# Patient Record
Sex: MALE | Race: WHITE | NOT HISPANIC OR LATINO | Employment: FULL TIME | URBAN - METROPOLITAN AREA
[De-identification: names, ages, dates, MRNs, and addresses within clinical notes are randomized per-mention and may not be internally consistent; named-entity substitution may affect disease eponyms.]

---

## 2017-11-08 ENCOUNTER — APPOINTMENT (EMERGENCY)
Dept: RADIOLOGY | Facility: HOSPITAL | Age: 42
End: 2017-11-08
Payer: COMMERCIAL

## 2017-11-08 ENCOUNTER — HOSPITAL ENCOUNTER (EMERGENCY)
Facility: HOSPITAL | Age: 42
Discharge: HOME | End: 2017-11-08
Attending: EMERGENCY MEDICINE | Admitting: EMERGENCY MEDICINE
Payer: COMMERCIAL

## 2017-11-08 VITALS
HEIGHT: 68 IN | OXYGEN SATURATION: 99 % | TEMPERATURE: 98 F | BODY MASS INDEX: 28.79 KG/M2 | RESPIRATION RATE: 18 BRPM | HEART RATE: 92 BPM | SYSTOLIC BLOOD PRESSURE: 118 MMHG | DIASTOLIC BLOOD PRESSURE: 60 MMHG | WEIGHT: 190 LBS

## 2017-11-08 DIAGNOSIS — S29.019A: Primary | ICD-10-CM

## 2017-11-08 PROCEDURE — 71100 X-RAY EXAM RIBS UNI 2 VIEWS: CPT

## 2017-11-08 PROCEDURE — 71020 HB CHEST X-RAY 2VW FRONTAL&LATL: CPT

## 2017-11-08 PROCEDURE — 99284 EMERGENCY DEPT VISIT MOD MDM: CPT

## 2017-11-08 RX ORDER — OXYCODONE HYDROCHLORIDE AND ACETAMINOPHEN 5; 325 MG/1; MG/1
1 TABLET ORAL EVERY 6 HOURS PRN
Qty: 20 TABLET | Refills: 0 | Status: SHIPPED | OUTPATIENT
Start: 2017-11-08 | End: 2019-05-12 | Stop reason: ALTCHOICE

## 2017-11-09 NOTE — DISCHARGE INSTRUCTIONS
Rest  Ice to the area  Motrin Tylenol for mild pain  Percocet 1 every 6 hours if needed for increasing pain or tenderness  Follow up with your doctor or return if worse    Muscle Strain   WHAT YOU NEED TO KNOW:   A muscle strain is a twist, pull, or tear of a muscle or tendon  A tendon is a strong elastic tissue that connects a muscle to a bone  Signs of a strained muscle include bruising and swelling over the area, pain with movement, and loss of strength  DISCHARGE INSTRUCTIONS:   Return to the emergency department if:   · You suddenly cannot feel or move your injured muscle  Contact your healthcare provider if:   · Your pain and swelling worsen or do not go away  · You have questions or concerns about your condition or care  Medicines:   · NSAIDs  help decrease swelling and pain or fever  This medicine is available with or without a doctor's order  NSAIDs can cause stomach bleeding or kidney problems in certain people  If you take blood thinner medicine, always ask your healthcare provider if NSAIDs are safe for you  Always read the medicine label and follow directions  · Muscle relaxers  help decrease pain and muscle spasms  · Take your medicine as directed  Contact your healthcare provider if you think your medicine is not helping or if you have side effects  Tell him of her if you are allergic to any medicine  Keep a list of the medicines, vitamins, and herbs you take  Include the amounts, and when and why you take them  Bring the list or the pill bottles to follow-up visits  Carry your medicine list with you in case of an emergency  Follow up with your healthcare provider as directed: Your healthcare provider may suggest that you have a follow-up visit before you go back to your usual activity  Write down your questions so you remember to ask them during your visits    Self-care:   · 3 to 7 days after the injury:  Use Rest, Ice, Compression, and Elevation (RICE) to help stop bruising and decrease pain and swelling  ¨ Rest:  Rest your muscle to allow your injury to heal  When the pain decreases, begin normal, slow movements  For mild and moderate muscle strains, you should rest your muscles for about 2 days  However, if you have a severe muscle strain, you should rest for 10 to 14 days  You may need to use crutches to walk if your muscle strain is in your legs or lower body  ¨ Ice:  Put an ice pack on the injured area  Put a towel between the ice pack and your skin  Do not put the ice pack directly on your skin  You can use a package of frozen peas instead of an ice pack  ¨ Compression:  You may need to wrap an elastic bandage around the area to decrease swelling  It should be tight enough for you to feel support  Do not wrap it too tightly  ¨ Elevation:  Keep the injured muscle raised above your heart if possible  For example if you have a strain of your lower leg muscle, lie down and prop your leg up on pillows  This helps decrease pain and swelling  · 3 to 21 days after the injury:  Start to slowly and regularly exercise your muscle  This will help it heal  If you feel pain, decrease how hard you are exercising  · 1 to 6 weeks after the injury:  Stretch the injured muscle  Hold the stretch for about 30 seconds  Do this 4 times a day  You may stretch the muscle until you feel a slight pull  Stop stretching if you feel pain  · 2 weeks to 6 months after the injury:  The goal of this phase is to return to the activity you were doing before the injury happened, without hurting the muscle again  · 3 weeks to 6 months after the injury:  Keep stretching and strengthening your muscles to avoid injury  Slowly increase the time and distance that you exercise  You may have signs and symptoms of muscle strain 6 months after the injury, even if you do things to help it heal  In this case, you may need surgery on the muscle    © 2017 [employer] [employer] Information is for End User's use only and may not be sold, redistributed or otherwise used for commercial purposes  All illustrations and images included in CareNotes® are the copyrighted property of A D A M [employer] Inc  or Rajat Sierra  The above information is an  only  It is not intended as medical advice for individual conditions or treatments  Talk to your doctor, nurse or pharmacist before following any medical regimen to see if it is safe and effective for you

## 2017-11-09 NOTE — ED PROVIDER NOTES
History  Chief Complaint   Patient presents with    Flank Pain     PT COUGHED AROUND 6PM AND FELT A POP IN HIS RIGHT SIDE - HAVING PAIN DENIES NVD     HPI patient is a 47-year-old male, he reports he was coming home from work, coughed and felt a sudden pop in his right lower ribs  Patient complains of a sharp right lower rib pain, points to approximately the 12th rib on the right side  He complains of pain now with deep inspiration  He complains of pain with cough  He reports pain with palpation  Patient has no pain at rest   But when he begins to move or role he gets severe pain in that area  He denies any vomiting  There are no urinary symptoms  Denies any fever or chills  Denies any previous rib injury  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 initially he thought he hurt himself on his gun but the pain became worse with movement  Patient reports the pain was produced by a sudden cough  Past medical history of back problems  Family history noncontributory  Social history, works in law enforcement,, nonsmoker, here with his wife, apparently going on a vacation tomorrow  None       History reviewed  No pertinent past medical history  Past Surgical History:   Procedure Laterality Date    BACK SURGERY         History reviewed  No pertinent family history  I have reviewed and agree with the history as documented  Social History   Substance Use Topics    Smoking status: Never Smoker    Smokeless tobacco: Never Used    Alcohol use No        Review of Systems   Constitutional: Negative for diaphoresis, fatigue and fever  HENT: Negative for congestion, ear pain, nosebleeds and sore throat  Eyes: Negative for photophobia, pain, discharge and visual disturbance  Respiratory: Negative for cough, choking, chest tightness, shortness of breath and wheezing  Cardiovascular: Negative for chest pain and palpitations     Gastrointestinal: Negative for abdominal distention, abdominal pain, diarrhea and vomiting  Genitourinary: Negative for dysuria, flank pain and frequency  Musculoskeletal: Negative for back pain, gait problem and joint swelling  Skin: Negative for color change and rash  Neurological: Negative for dizziness, syncope and headaches  Psychiatric/Behavioral: Negative for behavioral problems and confusion  The patient is not nervous/anxious  All other systems reviewed and are negative   right rib pain    Physical Exam  ED Triage Vitals [11/08/17 2034]   Temperature Pulse Respirations Blood Pressure SpO2   98 °F (36 7 °C) 92 18 118/60 99 %      Temp Source Heart Rate Source Patient Position - Orthostatic VS BP Location FiO2 (%)   Temporal Monitor Sitting Right arm --      Pain Score       8           Orthostatic Vital Signs  Vitals:    11/08/17 2034   BP: 118/60   Pulse: 92   Patient Position - Orthostatic VS: Sitting       Physical Exam   Constitutional: He is oriented to person, place, and time  He appears well-developed and well-nourished  HENT:   Head: Normocephalic  Right Ear: External ear normal    Left Ear: External ear normal    Nose: Nose normal    Mouth/Throat: Oropharynx is clear and moist    Eyes: EOM and lids are normal  Pupils are equal, round, and reactive to light  Neck: Normal range of motion  Neck supple  Cardiovascular: Normal rate, regular rhythm and normal heart sounds  Pulmonary/Chest: Effort normal  No respiratory distress  He exhibits tenderness  There is tenderness on the right side, right lower ribs approximately the 12th rib, no pain when at rest, there is pain with movement twisting or rotation nurse pain with direct palpation  No no subcutaneous air   Abdominal: Soft  Bowel sounds are normal  He exhibits no distension and no mass  There is no tenderness  There is no rebound and no guarding  No hernia  Musculoskeletal: Normal range of motion  He exhibits no deformity     Neurological: He is alert and oriented to person, place, and time  Skin: Skin is warm and dry  Psychiatric: He has a normal mood and affect  Nursing note and vitals reviewed  Pulse oximetry 99% on room air adequate oxygenation, no  hypoxia    ED Medications  Medications - No data to display    Diagnostic Studies  Results Reviewed     None                 XR chest pa & lateral    (Results Pending)   XR ribs 2 views RIGHT    (Results Pending)              Procedures  Procedures       Phone Contacts  ED Phone Contact    ED Course  ED Course         Chest x-ray: Chest x-ray showed a normal cardiac silhouette, no pneumothorax no infiltrates, No sign of pathology, interpreted by me, I was the primary   right rib x-ray shows no fracture interpreted by me I was initial   MDM medical decision making 57-year-old male with right-sided rib pain, tenderness over the right lower 12th rib, pain with movement, no pain with rest, no shortness of breath, no indication for further evaluated nonstick evaluation, negative x-rays chest x-ray and right rib x-rays  Most consistent with intercostal muscle tear  Discussed with patient at length  Patient is traveling on vacation over the next few days, will treat with narcotic analgesics if needed  We discussed treatment and follow-up  CritCare Time    Disposition  Final diagnoses:   Tear of intercostal muscle, initial encounter     Time reflects when diagnosis was documented in both MDM as applicable and the Disposition within this note     Time User Action Codes Description Comment    11/8/2017  9:32 PM Rere Santo Add [[id number]] Tear of intercostal muscle, initial encounter       ED Disposition     ED Disposition Condition Comment    Discharge  Nahed Villalobos discharge to home/self care      Condition at discharge: Good        Follow-up Information     Follow up With Specialties Details Why Contact Solo Cope,     [address] [place] [id number]          Discharge Medication List as of 11/8/2017  9:33 PM      START taking these medications    Details   oxyCODONE-acetaminophen (PERCOCET) 5-325 mg per tablet Take 1 tablet by mouth every 6 (six) hours as needed for severe pain for up to 20 doses Max Daily Amount: 4 tablets, Starting Wed 11/8/2017, Print           No discharge procedures on file      ED Provider  Electronically Signed by           Jessica Hernandez MD  11/08/17 1363

## 2017-12-13 ENCOUNTER — HOSPITAL ENCOUNTER (EMERGENCY)
Facility: HOSPITAL | Age: 42
Discharge: HOME | End: 2017-12-13
Admitting: EMERGENCY MEDICINE
Payer: COMMERCIAL

## 2017-12-13 VITALS
HEART RATE: 85 BPM | WEIGHT: 199.52 LBS | SYSTOLIC BLOOD PRESSURE: 141 MMHG | BODY MASS INDEX: 30.34 KG/M2 | RESPIRATION RATE: 18 BRPM | TEMPERATURE: 98.7 F | OXYGEN SATURATION: 96 % | DIASTOLIC BLOOD PRESSURE: 77 MMHG

## 2017-12-13 DIAGNOSIS — J20.9 ACUTE BRONCHITIS WITH BRONCHOSPASM: Primary | ICD-10-CM

## 2017-12-13 PROCEDURE — 99283 EMERGENCY DEPT VISIT LOW MDM: CPT

## 2017-12-13 RX ORDER — PREDNISONE 1 MG/1
TABLET ORAL
Qty: 21 TABLET | Refills: 0 | Status: SHIPPED | OUTPATIENT
Start: 2017-12-13 | End: 2019-05-12 | Stop reason: ALTCHOICE

## 2017-12-13 RX ORDER — ALBUTEROL SULFATE 90 UG/1
2 AEROSOL, METERED RESPIRATORY (INHALATION) EVERY 6 HOURS PRN
Qty: 1 INHALER | Refills: 0 | Status: SHIPPED | OUTPATIENT
Start: 2017-12-13

## 2017-12-13 RX ORDER — AZITHROMYCIN 250 MG/1
TABLET, FILM COATED ORAL
Qty: 6 TABLET | Refills: 0 | Status: SHIPPED | OUTPATIENT
Start: 2017-12-13 | End: 2019-05-12 | Stop reason: ALTCHOICE

## 2017-12-13 NOTE — DISCHARGE INSTRUCTIONS
Acute Bronchitis   WHAT YOU NEED TO KNOW:   Acute bronchitis is swelling and irritation in the air passages of your lungs  This irritation may cause you to cough or have other breathing problems  Acute bronchitis often starts because of another illness, such as a cold or the flu  The illness spreads from your nose and throat to your windpipe and airways  Bronchitis is often called a chest cold  Acute bronchitis lasts about 3 to 6 weeks and is usually not a serious illness  Your cough can last for several weeks  DISCHARGE INSTRUCTIONS:   Return to the emergency department if:   · You cough up blood  · Your lips or fingernails turn blue  · You feel like you are not getting enough air when you breathe  Contact your healthcare provider if:   · You have a fever  · Your breathing problems do not go away or get worse  · Your cough does not get better within 4 weeks  · You have questions or concerns about your condition or care  Self-care:   · Get more rest   Rest helps your body to heal  Slowly start to do more each day  Rest when you feel it is needed  · Avoid irritants in the air  Avoid chemicals, fumes, and dust  Wear a face mask if you must work around dust or fumes  Stay inside on days when air pollution levels are high  If you have allergies, stay inside when pollen counts are high  Do not use aerosol products, such as spray-on deodorant, bug spray, and hair spray  · Do not smoke or be around others who smoke  Nicotine and other chemicals in cigarettes and cigars damages the cilia that move mucus out of your lungs  Ask your healthcare provider for information if you currently smoke and need help to quit  E-cigarettes or smokeless tobacco still contain nicotine  Talk to your healthcare provider before you use these products  · Drink liquids as directed  Liquids help keep your air passages moist and help you cough up mucus   You may need to drink more liquids when you have acute bronchitis  Ask how much liquid to drink each day and which liquids are best for you  · Use a humidifier or vaporizer  Use a cool mist humidifier or a vaporizer to increase air moisture in your home  This may make it easier for you to breathe and help decrease your cough  Decrease risk for acute bronchitis:   · Get the vaccinations you need  Ask your healthcare provider if you should get vaccinated against the flu or pneumonia  · Prevent the spread of germs  You can decrease your risk of acute bronchitis and other illnesses by doing the following:     [facility] your hands often with soap and water  Carry germ-killing hand lotion or gel with you  You can use the lotion or gel to clean your hands when soap and water are not available  ¨ Do not touch your eyes, nose, or mouth unless you have washed your hands first     ¨ Always cover your mouth when you cough to prevent the spread of germs  It is best to cough into a tissue or your shirt sleeve instead of into your hand  Ask those around you cover their mouths when they cough  ¨ Try to avoid people who have a cold or the flu  If you are sick, stay away from others as much as possible  Medicines: Your healthcare provider may  give you any of the following:  · Ibuprofen or acetaminophen  are medicines that help lower your fever  They are available without a doctor's order  Ask your healthcare provider which medicine is right for you  Ask how much to take and how often to take it  Follow directions  These medicines can cause stomach bleeding if not taken correctly  Ibuprofen can cause kidney damage  Do not take ibuprofen if you have kidney disease, an ulcer, or allergies to aspirin  Acetaminophen can cause liver damage  Do not take more than 4,000 milligrams in 24 hours  · Decongestants  help loosen mucus in your lungs and make it easier to cough up  This can help you breathe easier  · Cough suppressants  decrease your urge to cough   If your cough produces mucus, do not take a cough suppressant unless your healthcare provider tells you to  Your healthcare provider may suggest that you take a cough suppressant at night so you can rest     · Inhalers  may be given  Your healthcare provider may give you one or more inhalers to help you breathe easier and cough less  An inhaler gives your medicine to open your airways  Ask your healthcare provider to show you how to use your inhaler correctly  · Take your medicine as directed  Contact your healthcare provider if you think your medicine is not helping or if you have side effects  Tell him of her if you are allergic to any medicine  Keep a list of the medicines, vitamins, and herbs you take  Include the amounts, and when and why you take them  Bring the list or the pill bottles to follow-up visits  Carry your medicine list with you in case of an emergency  Follow up with your healthcare provider as directed:  Write down questions you have so you will remember to ask them during your follow-up visits  © 2017 [address] [employer] Information is for End User's use only and may not be sold, redistributed or otherwise used for commercial purposes  All illustrations and images included in CareNotes® are the copyrighted property of [employer]  or Rajat Sierra  The above information is an  only  It is not intended as medical advice for individual conditions or treatments  Talk to your doctor, nurse or pharmacist before following any medical regimen to see if it is safe and effective for you  How to Stop Smoking   WHAT YOU NEED TO KNOW:   You will improve your health and the health of others around you if you stop smoking  Your risk for heart and lung disease, cancer, stroke, heart attack, and vision problems will also decrease  You can benefit from quitting no matter how long you have smoked    DISCHARGE INSTRUCTIONS:   Prepare to stop smoking:  Nicotine is a highly addictive drug found in cigarettes  Withdrawal symptoms can happen when you stop smoking and make it hard to quit  These include anxiety, depression, irritability, trouble sleeping, and increased appetite  You increase your chances of success if you prepare to quit  · Set a quit date  Deryl Sin a date that is within the next 2 weeks  Do not pick a day that you think may be stressful or busy  Write down the day or [place] it on your calender  · Tell friends and family that you plan to quit  Explain that you may have withdrawal symptoms when you try to quit  Ask them to support you  They may be able to encourage you and help reduce your stress to make it easier for you to quit  · Make a list of your reasons for quitting  Put the list somewhere you will see it every day, such as your refrigerator  You can look at the list when you have a craving  · Remove all tobacco and nicotine products from your home, car, and workplace  Also, remove anything else that will tempt you to smoke, such as lighters, matches, or ashtrays  Clean your car, home, and places at work that smell like smoke  The smell of smoke can trigger a craving  · Identify triggers that make you want to smoke  This may include activities, feelings, or people  Also write down 1 way you can deal with each of your triggers  For example, if you want to smoke as soon as you wake up, plan another activity during this time, such as exercise  · Make a plan for how you will quit  Learn about the tools that can help you quit, such as medicine, counseling, or nicotine replacement therapy  Choose at least 2 options to help you quit  Tools to help you stop smoking:   · Counseling  from a trained healthcare provider can provide you with support and skills to quit smoking  The provider will also teach you to manage your withdrawal symptoms and cravings   You may receive counseling from one counselor, in group therapy, or through phone therapy called a quit line  · Nicotine replacement therapy (NRT)  such as nicotine patches, gum, or lozenges may help reduce your nicotine cravings  You may get these without a doctor's order  Do not use e-cigarettes or smokeless tobacco in place of cigarettes or to help you quit  They still contain nicotine  · Prescription medicines  such as nasal sprays or nicotine inhalers may help reduce your withdrawal symptoms  Other medicines may also be used to reduce your urge to smoke  Ask your healthcare provider about these medicines  You may need to start certain medicines 2 weeks before your quit date for them to work well  · Hypnosis  is a practice that helps guide you through thoughts and feelings  Hypnosis may help decrease your cravings and make you more willing to quit  · Acupuncture therapy  uses very thin needles to balance energy channels in the body  This is thought to help decrease cravings and symptoms of nicotine withdrawal      · Support groups  let you talk to others who are trying to quit or have already quit  It may be helpful to speak with others about how they quit  Manage your cravings:   · Avoid situations, people, and places that tempt you to smoke  Go to nonsmoking places, such as libraries or restaurants  Understand what tempts you and try to avoid these things  · Keep your hands busy  Hold things such as a stress ball or pen  · Put candy or toothpicks in your mouth  Keep lollipops, sugarless gum, or toothpicks with you at all times  · Do not have alcohol or caffeine  These drinks may tempt you to smoke  Drink healthy liquids such as water or juice instead  · Reward yourself when you resist your cravings  Rewards will motivate you and help you stay positive  · Do an activity that distracts you from your craving  Examples include going for a walk, exercising, or cleaning  Prevent weight gain after you quit:  You may gain a few pounds after you quit smoking   It is healthier for you to gain a few pounds than to continue to smoke  The following can help you prevent weight gain:  · Eat healthy foods  These include fruits, vegetables, whole-grain breads, low-fat dairy products, beans, lean meats, and fish  Eat healthy snacks, such as low-fat yogurt, if you get hungry between meals  · Drink water before, during, and between meals  This will make your stomach feel full and help prevent you from overeating  Ask your healthcare provider how much liquid to drink each day and which liquids are best for you  · Exercise  Take a walk or do some kind of exercise every day  Ask your healthcare provider what exercise is right for you  This may help reduce your cravings and reduce stress  For support and more information:   · [employer]oke[employer]  gov  Phone: 3- 258 - 482-7228  Web Address: [employer]  gov  © 2017 [address] [employer] Information is for End User's use only and may not be sold, redistributed or otherwise used for commercial purposes  All illustrations and images included in CareNotes® are the copyrighted property of [employer]  or Rajat Sierra  The above information is an  only  It is not intended as medical advice for individual conditions or treatments  Talk to your doctor, nurse or pharmacist before following any medical regimen to see if it is safe and effective for you

## 2017-12-13 NOTE — ED PROVIDER NOTES
History  Chief Complaint   Patient presents with    Cough     pt c/o productive cough and nasal congestion that started yesterday  pt denies any fevers or GI symptoms  pt reports feeling "achy"     47-year-old male with no significant past medical history presents to the emergency department with chief complaint of cough with congestion  Onset of symptoms reported as 1 day ago  Location of symptoms reported as the chest and upper respiratory tract  Quality is reported as congestion  Severity is reported as moderate  Associated symptoms:  Positive for myalgias  Denies fevers  Positive for sore throat  Positive for cough  Positive for congestion  Denies chest pain  Modifying factors:  Patient reports nothing has been tried for treatment of symptoms  Context:  Patient is cigarette smoker  He reports positive recent sick contacts including co-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 recently having similar symptoms  Denies any recent travel outside the country  Medical summary:  Review of past visit history via epic demonstrates patient was last seen on November 8, 2017 in the emergency department for evaluation of tear of an intercostal muscle  History provided by:  Patient   used: No    Cough   Associated symptoms: myalgias, rhinorrhea, sore throat and wheezing    Associated symptoms: no chest pain, no chills, no diaphoresis, no ear pain, no eye discharge, no fever, no headaches, no rash and no shortness of breath        Prior to Admission Medications   Prescriptions Last Dose Informant Patient Reported? Taking?   oxyCODONE-acetaminophen (PERCOCET) 5-325 mg per tablet   No No   Sig: Take 1 tablet by mouth every 6 (six) hours as needed for severe pain for up to 20 doses Max Daily Amount: 4 tablets      Facility-Administered Medications: None       History reviewed  No pertinent past medical history      Past Surgical History:   Procedure Laterality Date    BACK SURGERY History reviewed  No pertinent family history  I have reviewed and agree with the history as documented  Social History   Substance Use Topics    Smoking status: Current Every Day Smoker     Packs/day: 2 00     Types: Cigarettes    Smokeless tobacco: Never Used    Alcohol use No        Review of Systems   Constitutional: Negative for activity change, appetite change, chills, diaphoresis, fatigue, fever and unexpected weight change  HENT: Positive for congestion, postnasal drip, rhinorrhea and sore throat  Negative for dental problem, drooling, ear discharge, ear pain, facial swelling, hearing loss, mouth sores, nosebleeds, sinus pain, sinus pressure, sneezing, tinnitus, trouble swallowing and voice change  Eyes: Negative for photophobia, pain, discharge, redness, itching and visual disturbance  Respiratory: Positive for cough and wheezing  Negative for choking, chest tightness and shortness of breath  Cardiovascular: Negative for chest pain, palpitations and leg swelling  Gastrointestinal: Negative for abdominal distention, abdominal pain, constipation, diarrhea, nausea and vomiting  Endocrine: Negative for cold intolerance, heat intolerance, polydipsia, polyphagia and polyuria  Genitourinary: Negative for difficulty urinating, dysuria, flank pain, frequency, hematuria and urgency  Musculoskeletal: Positive for myalgias  Negative for arthralgias, back pain, gait problem, joint swelling, neck pain and neck stiffness  Skin: Negative for color change, pallor, rash and wound  Allergic/Immunologic: Negative for environmental allergies, food allergies and immunocompromised state  Neurological: Negative for dizziness, tremors, seizures, syncope, facial asymmetry, speech difficulty, weakness, light-headedness, numbness and headaches  Hematological: Negative for adenopathy  Does not bruise/bleed easily  Psychiatric/Behavioral: Negative for agitation, confusion and hallucinations   The patient is not nervous/anxious  All other systems reviewed and are negative  Physical Exam  ED Triage Vitals   Temperature Pulse Respirations Blood Pressure SpO2   12/13/17 1604 12/13/17 1602 12/13/17 1602 12/13/17 1602 12/13/17 1602   98 7 °F (37 1 °C) 85 18 141/77 96 %      Temp Source Heart Rate Source Patient Position - Orthostatic VS BP Location FiO2 (%)   12/13/17 1604 12/13/17 1602 12/13/17 1602 12/13/17 1602 --   Oral Monitor Sitting Right arm       Pain Score       12/13/17 1602       No Pain           Orthostatic Vital Signs  Vitals:    12/13/17 1602   BP: 141/77   Pulse: 85   Patient Position - Orthostatic VS: Sitting       Physical Exam   Constitutional: He is oriented to person, place, and time  He appears well-developed and well-nourished  No distress  /77   Pulse 85   Temp 98 7 °F (37 1 °C) (Oral)   Resp 18   Wt 90 5 kg (199 lb 8 3 oz)   SpO2 96%   BMI 30 34 kg/m²   Interpretation normal, no intervention  HENT:   Head: Normocephalic and atraumatic  Right Ear: External ear normal    Left Ear: External ear normal    Nose: Nose normal    Mouth/Throat: No oropharyngeal exudate  Posterior pharynx is injected and mildly erythematous  There is yellow mucus drainage noted posteriorly  Uvula is midline without deviation  Mucous membranes are moist and pink  Eyes: Conjunctivae and EOM are normal  Pupils are equal, round, and reactive to light  Right eye exhibits no discharge  Left eye exhibits no discharge  No scleral icterus  Neck: Normal range of motion  Neck supple  No tracheal deviation present  No thyromegaly present  Cardiovascular: Normal rate, regular rhythm and intact distal pulses  Pulmonary/Chest: Effort normal  No stridor  No respiratory distress  He has no rales  He exhibits no tenderness  Breath sounds are present bilaterally with occasional scattered rhonchi and expiratory wheezes noted  No accessory muscle use or retractions  Abdominal: Soft   Bowel sounds are normal  He exhibits no distension and no mass  There is no tenderness  There is no rebound and no guarding  Musculoskeletal: Normal range of motion  He exhibits no edema, tenderness or deformity  Lymphadenopathy:     He has no cervical adenopathy  Neurological: He is alert and oriented to person, place, and time  He displays normal reflexes  No cranial nerve deficit  He exhibits normal muscle tone  Coordination normal    Skin: Skin is warm and dry  Capillary refill takes less than 2 seconds  No rash noted  He is not diaphoretic  No erythema  No pallor  Psychiatric: He has a normal mood and affect  His behavior is normal  Judgment and thought content normal    Nursing note and vitals reviewed  ED Medications  Medications - No data to display    Diagnostic Studies  Results Reviewed     None                 No orders to display              Procedures  Procedures       Phone Contacts  ED Phone Contact    ED Course  ED Course                                MDM  Number of Diagnoses or Management Options  Acute bronchitis with bronchospasm: new and does not require workup  Diagnosis management comments: ddx includes but is not limited to:  URI, RSV, sinusitis, OE, OM, serous otitis media,  flu, allergies, viral syndrome, asthma, bronchitis, pneumonia, consider but doubt interstitial lung disease, pertussis  Discussed with patient symptoms appear most consistent with acute bronchitis  Will treat with a course of antibiotic coverage including azithromycin  Add albuterol inhaler and prednisone taper given the patient is a smoker  Follow up with primary care physician in 2-3 days  Reviewed reasons to return to the emergency department     smoking cessation:  patient is a current cigarette smoker, patient was counselled regarding smoking cessation here in ed   counselling was performed for greater than 3 minutes during which time patient was awake and is receptive to counselling    patient was offered therapy  for treatment but declined  additional resources regarding smoking cessation were given on discharge  Amount and/or Complexity of Data Reviewed  Obtain history from someone other than the patient: yes (Wife)  Review and summarize past medical records: yes    Patient Progress  Patient progress: stable    CritCare Time    Disposition  Final diagnoses:   Acute bronchitis with bronchospasm     Time reflects when diagnosis was documented in both MDM as applicable and the Disposition within this note     Time User Action Codes Description Comment    12/13/2017  4:14 PM Dionte Shultz, [address] [[address]] Acute bronchitis with bronchospasm       ED Disposition     ED Disposition Condition Comment    Discharge  Beverley Nieves discharge to home/self care  Condition at discharge: Good        Follow-up Information     Follow up With Specialties Details Why Contact Info Additional Information    Musa Bryan, DO  Call in 2 days for further evaluation of symptoms [address] Emergency Department Emergency Medicine Go to If symptoms worsen [address] [place] [facility] [address]  [phone] [place] [facility], [address] [place] [address], [facility]        Patient's Medications   Discharge Prescriptions    ALBUTEROL (PROVENTIL HFA,VENTOLIN HFA) 90 MCG/ACT INHALER    Inhale 2 puffs every 6 (six) hours as needed for wheezing       Start Date: 12/13/2017End Date: --       Order Dose: 2 puffs       Quantity: 1 Inhaler    Refills: 0    AZITHROMYCIN (ZITHROMAX Z-JOSE DE JESUS) 250 MG TABLET    2 tablets PO on day 1, then 1 tablet PO daily x 4 days         Start Date: 12/13/2017End Date: --       Order Dose: --       Quantity: 6 tablet    Refills: 0    PREDNISONE 5 MG TABLET    40 mg PO day 1, then 30 mg PO day 2, 20 mg PO day 3, 10 mg PO day 4, 5 mg PO day 5 then stop       Start Date: 12/13/2017End Date: --       Order Dose: -- Quantity: 21 tablet    Refills: 0     No discharge procedures on file      ED Provider  Electronically Signed by           Michael Philip PA-C  12/13/17 8899

## 2018-08-10 ENCOUNTER — HOSPITAL ENCOUNTER (EMERGENCY)
Facility: HOSPITAL | Age: 43
Discharge: HOME | End: 2018-08-10
Attending: EMERGENCY MEDICINE
Payer: COMMERCIAL

## 2018-08-10 VITALS
DIASTOLIC BLOOD PRESSURE: 67 MMHG | SYSTOLIC BLOOD PRESSURE: 138 MMHG | TEMPERATURE: 98.1 F | WEIGHT: 195 LBS | OXYGEN SATURATION: 97 % | BODY MASS INDEX: 29.55 KG/M2 | RESPIRATION RATE: 17 BRPM | HEIGHT: 68 IN | HEART RATE: 98 BPM
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
## 2018-08-10 PROCEDURE — 99283 EMERGENCY DEPT VISIT LOW MDM: CPT

## 2018-08-10 RX ORDER — AMOXICILLIN AND CLAVULANATE POTASSIUM 875; 125 MG/1; MG/1
1 TABLET, FILM COATED ORAL 2 TIMES DAILY
Qty: 14 TABLET | Refills: 0 | Status: SHIPPED | OUTPATIENT
Start: 2018-08-10 | End: 2018-08-17

## 2018-08-10 RX ORDER — AMOXICILLIN AND CLAVULANATE POTASSIUM 875; 125 MG/1; MG/1
1 TABLET, FILM COATED ORAL ONCE
Status: COMPLETED | OUTPATIENT
Start: 2018-08-10 | End: 2018-08-10

## 2018-08-10 RX ADMIN — AMOXICILLIN AND CLAVULANATE POTASSIUM 1 TABLET: 875; 125 TABLET, FILM COATED ORAL at 22:07

## 2018-08-11 NOTE — DISCHARGE INSTRUCTIONS
Return to the Emergency Department sooner if increased rash, fever, vomiting, difficulty breathing, lethargy, pain  Animal Bite   WHAT YOU NEED TO KNOW:   What do I need to know about an animal bite? Animal bite injuries range from shallow cuts to deep, life-threatening wounds  An animal can cut or puncture the skin when it bites  Your skin may be torn from your body  Your skin may swell or bruise even if the bite does not break the skin  Animal bites occur more often on the hands, arms, legs, and face  Bites from dogs and cats are the most common injuries  What does my healthcare provider need to know about my animal bite? · What kind of animal bit you? Is the animal a pet? If so, are its vaccines updated? · When and where did the bite happen? Was the animal bothered by you or another person before it bit? Did the animal show any fear? · Can the animal be brought in to watch it for sickness or disease? · Has the wound been treated? If so, what did you use to treat it? · Do you have any health conditions? Do you currently take any medicines? When was your last tetanus shot? What tests may I need after an animal bite? Your healthcare provider will look at how big and deep the bite wounds are  He will ask if any area feels numb  Your healthcare provider will check how well you can move the bitten area  He will also check for signs of infection  You may also need the following:  · Blood tests and a sample of fluid or tissue from your wound  may show if you have an infection  · An x-ray  may show fractures or foreign objects in your wound  How is an animal bite treated? · Irrigation and debridement  may be needed to clean out your wound  Dead, damaged, or infected tissue may be cut away to help your wound heal     · Medicines:      ¨ Antibiotics  prevent or treat a bacterial infection  ¨ Prescription pain medicine  may be given  Ask how to take this medicine safely      ¨ A tetanus vaccine  may be needed to prevent tetanus  Tetanus is a life-threatening bacterial infection that affects the nerves and muscles  The bacteria can be spread through animal bites  ¨ A rabies vaccine  may be needed to prevent rabies  Rabies is a life-threatening viral infection  The virus can be spread through animal bites  · Stitches  may be needed if your wound is large and not infected  · Surgery  may be needed to repair deep injuries or severe wounds  What can I do to manage my symptoms? · Apply antibiotic ointment as directed  This helps prevent infection in minor skin wounds  It is available without a doctor's order  · Keep the wound clean and covered  Wash the wound every day with soap and water or germ-killing cleanser  Ask your healthcare provider about the kinds of bandages to use  · Apply ice on your wound  Ice helps decrease swelling and pain  Ice may also help prevent tissue damage  Use an ice pack, or put crushed ice in a plastic bag  Cover it with a towel and place it on your wound for 15 to 20 minutes every hour or as directed  · Elevate the wound area  Raise your wound above the level of your heart as often as you can  This will help decrease swelling and pain  Prop your wound on pillows or blankets to keep it elevated comfortably  What can I do to prevent an animal bite? · Learn to recognize the signs of a scared pet  Avoid quick, sudden movements  · Do not step between animals that are fighting  · Do not leave a pet alone with a young child  · Do not disturb an animal while it eats, sleeps, or cares for its young  · Do not approach an animal you do not know, especially one that is tied up or caged  · Stay away from animals that seem sick or act strangely  · Do not feed or capture wild animals  When should I seek immediate care? · You have a fever  · Your wound is red, swollen, and draining pus      · You see red streaks on the skin around the wound  · You can no longer move the bitten area  · Your heartbeat and breathing are much faster than usual     · You feel dizzy and confused  When should I contact my healthcare provider? · Your pain does not get better, even after you take pain medicine  · You have nightmares or flashbacks about the animal bite  · You have questions or concerns about your condition or care  CARE AGREEMENT:   You have the right to help plan your care  Learn about your health condition and how it may be treated  Discuss treatment options with your caregivers to decide what care you want to receive  You always have the right to refuse treatment  The above information is an  only  It is not intended as medical advice for individual conditions or treatments  Talk to your doctor, nurse or pharmacist before following any medical regimen to see if it is safe and effective for you  © 2017 [employer] Information is for End User's use only and may not be sold, redistributed or otherwise used for commercial purposes  All illustrations and images included in CareNotes® are the copyrighted property of A D A M [employer]  or Rajat Sierra

## 2018-08-11 NOTE — ED PROVIDER NOTES
History  Chief Complaint   Patient presents with    Dog Bite     Pt presents to ED with dog bite to LUQ of abdomen that occured around 1500 today  No bleeding at this time      68-year-old male here for dog bite to left upper abdomen  This occurred just prior to arrival   Patient states it was his friends/neighbor's dog  Dog is a family pet  Unsure of tetanus status  Did not puncture through the skin, superficial abrasion  He was wearing a shirt and the dog tooth did not penetrate through the sure  Bleeding has stopped  Only superficial involvement  He has minimal pain, states he just wanted to be safe and make sure he does not need anything for infection  Denies fevers chills nausea vomiting or rash  History provided by:  Patient   used: No    Dog Bite   Contact animal:  Dog  Animal bite location: LUQ  Time since incident:  1 hour  Pain details:     Severity:  No pain  Incident location:  Outside  Provoked: provoked    Notifications:  None  Animal's rabies vaccination status:  Unknown  Animal in possession: yes    Tetanus status:  Up to date  Relieved by:  Nothing  Worsened by:  Nothing  Ineffective treatments:  None tried  Associated symptoms: no fever, no numbness, no rash and no swelling        Prior to Admission Medications   Prescriptions Last Dose Informant Patient Reported? Taking? albuterol (PROVENTIL HFA,VENTOLIN HFA) 90 mcg/act inhaler   No No   Sig: Inhale 2 puffs every 6 (six) hours as needed for wheezing   azithromycin (ZITHROMAX Z-JOSE DE JESUS) 250 mg tablet   No No   Si tablets PO on day 1, then 1 tablet PO daily x 4 days     oxyCODONE-acetaminophen (PERCOCET) 5-325 mg per tablet   No No   Sig: Take 1 tablet by mouth every 6 (six) hours as needed for severe pain for up to 20 doses Max Daily Amount: 4 tablets   predniSONE 5 mg tablet   No No   Si mg PO day 1, then 30 mg PO day 2, 20 mg PO day 3, 10 mg PO day 4, 5 mg PO day 5 then stop Facility-Administered Medications: None       History reviewed  No pertinent past medical history  Past Surgical History:   Procedure Laterality Date    BACK SURGERY         No family history on file  I have reviewed and agree with the history as documented  Social History   Substance Use Topics    Smoking status: Current Every Day Smoker     Packs/day: 2 00     Types: Cigarettes    Smokeless tobacco: Never Used    Alcohol use No        Review of Systems   Constitutional: Negative for activity change, appetite change, chills, diaphoresis, fatigue, fever and unexpected weight change  HENT: Negative for congestion, rhinorrhea, sinus pressure, sore throat and trouble swallowing  Eyes: Negative for photophobia and visual disturbance  Respiratory: Negative for apnea, cough, choking, chest tightness, shortness of breath, wheezing and stridor  Cardiovascular: Negative for chest pain, palpitations and leg swelling  Gastrointestinal: Negative for abdominal distention, abdominal pain, blood in stool, constipation, diarrhea, nausea and vomiting  Genitourinary: Negative for decreased urine volume, difficulty urinating, dysuria, enuresis, flank pain, frequency, hematuria and urgency  Musculoskeletal: Negative for arthralgias, myalgias, neck pain and neck stiffness  Skin: Positive for wound  Negative for color change, pallor and rash  Allergic/Immunologic: Negative  Neurological: Negative for dizziness, tremors, syncope, weakness, light-headedness, numbness and headaches  Hematological: Negative  Psychiatric/Behavioral: Negative  All other systems reviewed and are negative  Physical Exam  Physical Exam   Constitutional: He is oriented to person, place, and time  He appears well-developed and well-nourished  Non-toxic appearance  He does not have a sickly appearance  He does not appear ill  No distress  HENT:   Head: Normocephalic and atraumatic     Eyes: EOM and lids are normal  Pupils are equal, round, and reactive to light  Neck: Normal range of motion  Neck supple  Cardiovascular: Normal rate, regular rhythm, S1 normal, S2 normal, normal heart sounds, intact distal pulses and normal pulses  Exam reveals no gallop, no distant heart sounds, no friction rub and no decreased pulses  No murmur heard  Pulses:       Radial pulses are 2+ on the right side, and 2+ on the left side  Pulmonary/Chest: Effort normal and breath sounds normal  No accessory muscle usage  No apnea, no tachypnea and no bradypnea  No respiratory distress  He has no decreased breath sounds  He has no wheezes  He has no rhonchi  He has no rales  Abdominal: Soft  Normal appearance  He exhibits no distension  There is no tenderness  There is no rigidity, no rebound and no guarding  Musculoskeletal: Normal range of motion  He exhibits no edema, tenderness or deformity  Neurological: He is alert and oriented to person, place, and time  No cranial nerve deficit  GCS eye subscore is 4  GCS verbal subscore is 5  GCS motor subscore is 6  Skin: Skin is warm and dry  Laceration noted  No rash noted  He is not diaphoretic  No erythema  No pallor  Psychiatric: His speech is normal    Nursing note and vitals reviewed        Vital Signs  ED Triage Vitals [08/10/18 2154]   Temperature Pulse Respirations Blood Pressure SpO2   98 1 °F (36 7 °C) 98 17 138/67 97 %      Temp Source Heart Rate Source Patient Position - Orthostatic VS BP Location FiO2 (%)   Oral Monitor Sitting Right arm --      Pain Score       No Pain           Vitals:    08/10/18 2154   BP: 138/67   Pulse: 98   Patient Position - Orthostatic VS: Sitting       Visual Acuity      ED Medications  Medications   amoxicillin-clavulanate (AUGMENTIN) 875-125 mg per tablet 1 tablet (1 tablet Oral Given 8/10/18 2207)       Diagnostic Studies  Results Reviewed     None                 No orders to display              Procedures  Procedures       Phone Contacts  ED Phone Contact    ED Course                               MDM  Number of Diagnoses or Management Options  Dog bite: new and requires workup  Diagnosis management comments: DDx including but not limited to: laceration, deep structure involvement, foreign body, fracture, wound infection  Risk of Complications, Morbidity, and/or Mortality  Presenting problems: low  Management options: low  General comments: Plan/medical decision [age]-year-old male with dog bite to left upper abdomen  No repair necessary at this time  Superficial   For the concern of infection we will start him on Augmentin  First dose tonight  Patient is unsure of the dog's rabies status however the dog can be observed for the next 10 days  I instructed the patient to return should the dog developed any symptoms of rabies or if the dog dies within that time  Patient agrees with the plan  We discussed earlier return parameters  Patient Progress  Patient progress: stable    CritCare Time    Disposition  Final diagnoses:   Dog bite     Time reflects when diagnosis was documented in both MDM as applicable and the Disposition within this note     Time User Action Codes Description Comment    8/10/2018 10:01 PM Nikos Galaviz Add Carlyto Lulu  0XXA] Dog bite       ED Disposition     ED Disposition Condition Comment    Discharge  Cara Aguillon discharge to home/self care      Condition at discharge: Good        Follow-up Information     Follow up With Specialties Details Why Contact Info Additional [address], DO Internal Medicine Call As needed , [address] Emergency Department Emergency Medicine Go to If symptoms worsen or if in 10 days dog dies  [facility] ED, [address], [place], [address]          Discharge Medication List as of 8/10/2018 10:02 PM      START taking these medications    Details   amoxicillin-clavulanate (AUGMENTIN) 875-125 mg per tablet Take 1 tablet by mouth 2 (two) times a day for 7 days, Starting Fri 8/10/2018, Until Fri 8/17/2018, Print         CONTINUE these medications which have NOT CHANGED    Details   albuterol (PROVENTIL HFA,VENTOLIN HFA) 90 mcg/act inhaler Inhale 2 puffs every 6 (six) hours as needed for wheezing, Starting Wed 12/13/2017, Print      azithromycin (ZITHROMAX Z-JOSE DE JESUS) 250 mg tablet 2 tablets PO on day 1, then 1 tablet PO daily x 4 days  , Print      oxyCODONE-acetaminophen (PERCOCET) 5-325 mg per tablet Take 1 tablet by mouth every 6 (six) hours as needed for severe pain for up to 20 doses Max Daily Amount: 4 tablets, Starting Wed 11/8/2017, Print      predniSONE 5 mg tablet 40 mg PO day 1, then 30 mg PO day 2, 20 mg PO day 3, 10 mg PO day 4, 5 mg PO day 5 then stop, Print           No discharge procedures on file      ED Provider  Electronically Signed by           Jamarcus Palm PA-C  08/10/18 6956

## 2019-05-12 ENCOUNTER — HOSPITAL ENCOUNTER (EMERGENCY)
Facility: HOSPITAL | Age: 44
Discharge: HOME | End: 2019-05-12
Attending: EMERGENCY MEDICINE
Payer: COMMERCIAL

## 2019-05-12 VITALS
OXYGEN SATURATION: 97 % | DIASTOLIC BLOOD PRESSURE: 59 MMHG | SYSTOLIC BLOOD PRESSURE: 122 MMHG | RESPIRATION RATE: 18 BRPM | HEART RATE: 92 BPM | TEMPERATURE: 98.8 F
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
## 2019-05-12 DIAGNOSIS — L03.90 CELLULITIS: ICD-10-CM

## 2019-05-12 PROCEDURE — 99283 EMERGENCY DEPT VISIT LOW MDM: CPT

## 2019-05-12 PROCEDURE — 99283 EMERGENCY DEPT VISIT LOW MDM: CPT | Performed by: EMERGENCY MEDICINE

## 2019-05-12 PROCEDURE — 86618 LYME DISEASE ANTIBODY: CPT | Performed by: EMERGENCY MEDICINE

## 2019-05-12 PROCEDURE — 36415 COLL VENOUS BLD VENIPUNCTURE: CPT | Performed by: EMERGENCY MEDICINE

## 2019-05-12 RX ORDER — DOXYCYCLINE HYCLATE 100 MG/1
100 CAPSULE ORAL ONCE
Status: COMPLETED | OUTPATIENT
Start: 2019-05-12 | End: 2019-05-12

## 2019-05-12 RX ORDER — DOXYCYCLINE HYCLATE 100 MG/1
100 CAPSULE ORAL 2 TIMES DAILY
Qty: 42 CAPSULE | Refills: 0 | Status: SHIPPED | OUTPATIENT
Start: 2019-05-12 | End: 2019-06-02

## 2019-05-12 RX ADMIN — MUPIROCIN: 20 OINTMENT TOPICAL at 17:17

## 2019-05-12 RX ADMIN — DOXYCYCLINE HYCLATE 100 MG: 100 CAPSULE ORAL at 17:05

## 2019-05-15 LAB
B BURGDOR IGG SER IA-ACNC: 0.33
B BURGDOR IGM SER IA-ACNC: 0.35

## 2019-08-23 ENCOUNTER — HOSPITAL ENCOUNTER (EMERGENCY)
Facility: HOSPITAL | Age: 44
Discharge: HOME | End: 2019-08-23
Attending: EMERGENCY MEDICINE
Payer: COMMERCIAL

## 2019-08-23 VITALS
TEMPERATURE: 98.9 F | HEART RATE: 86 BPM | WEIGHT: 193.12 LBS | HEIGHT: 68 IN | BODY MASS INDEX: 29.27 KG/M2 | OXYGEN SATURATION: 98 % | RESPIRATION RATE: 18 BRPM | DIASTOLIC BLOOD PRESSURE: 71 MMHG | SYSTOLIC BLOOD PRESSURE: 127 MMHG

## 2019-08-23 DIAGNOSIS — M54.42 ACUTE MIDLINE LOW BACK PAIN WITH LEFT-SIDED SCIATICA: Primary | ICD-10-CM

## 2019-08-23 PROCEDURE — 99283 EMERGENCY DEPT VISIT LOW MDM: CPT

## 2019-08-23 PROCEDURE — 99283 EMERGENCY DEPT VISIT LOW MDM: CPT | Performed by: EMERGENCY MEDICINE

## 2019-08-23 RX ORDER — PREDNISONE 20 MG/1
40 TABLET ORAL DAILY
Qty: 10 TABLET | Refills: 0 | Status: SHIPPED | OUTPATIENT
Start: 2019-08-23

## 2019-08-23 RX ORDER — CYCLOBENZAPRINE HCL 10 MG
10 TABLET ORAL 2 TIMES DAILY PRN
Qty: 10 TABLET | Refills: 0 | Status: SHIPPED | OUTPATIENT
Start: 2019-08-23

## 2019-08-23 RX ORDER — CYCLOBENZAPRINE HCL 10 MG
10 TABLET ORAL 3 TIMES DAILY PRN
COMMUNITY

## 2019-08-23 NOTE — ED PROVIDER NOTES
History  Chief Complaint   Patient presents with    Back Pain     Patient comes to ED c/o lower back pain that he states is recurrent  Patient states he sees the [facility] for this problem but is unable to drive to [place] today  3-4 days of recurrent low midline back pain which occasionally radiates down back of left leg  Ongoing h/o same for more than 20 years  No recent trauma  Prior laminectomy in 1996  No fever, chills, numbness, weakness, or incontinence  No recent spinal injections  No aggravating factors  Previously alleviated by flexeril but ran out  No other sxs or concerns at this time  Prior to Admission Medications   Prescriptions Last Dose Informant Patient Reported? Taking? albuterol (PROVENTIL HFA,VENTOLIN HFA) 90 mcg/act inhaler Not Taking at Unknown time  No No   Sig: Inhale 2 puffs every 6 (six) hours as needed for wheezing   Patient not taking: Reported on 8/23/2019   cyclobenzaprine (FLEXERIL) 10 mg tablet   Yes Yes   Sig: Take 10 mg by mouth 3 (three) times a day as needed for muscle spasms      Facility-Administered Medications: None       History reviewed  No pertinent past medical history  Past Surgical History:   Procedure Laterality Date    BACK SURGERY         History reviewed  No pertinent family history  I have reviewed and agree with the history as documented  Social History     Tobacco Use    Smoking status: Current Every Day Smoker     Packs/day: 2 00     Types: Cigarettes    Smokeless tobacco: Never Used   Substance Use Topics    Alcohol use: No    Drug use: No        Review of Systems   Constitutional: Negative for chills and fever  Musculoskeletal: Positive for back pain  Negative for gait problem, joint swelling, myalgias and neck stiffness  Physical Exam  Physical Exam   Constitutional: He is oriented to person, place, and time  He appears well-developed and well-nourished  No distress  HENT:   Head: Normocephalic and atraumatic     Eyes: Pupils are equal, round, and reactive to light  Conjunctivae and EOM are normal  Right eye exhibits no discharge  Left eye exhibits no discharge  Neck: Normal range of motion  Neck supple  No JVD present  Pulmonary/Chest: No stridor  Musculoskeletal: Normal range of motion  He exhibits no edema or deformity  Back is normal to inspection and palpation  No erythema or ecchymosis  Neurological: He is alert and oriented to person, place, and time  No cranial nerve deficit or sensory deficit  He exhibits normal muscle tone  Coordination normal    Skin: Skin is warm and dry  Capillary refill takes less than 2 seconds  No rash noted  He is not diaphoretic  No erythema  No pallor  Nursing note and vitals reviewed  Vital Signs  ED Triage Vitals [08/23/19 1640]   Temperature Pulse Respirations Blood Pressure SpO2   98 9 °F (37 2 °C) 86 18 127/71 98 %      Temp Source Heart Rate Source Patient Position - Orthostatic VS BP Location FiO2 (%)   Oral Monitor Sitting Left arm --      Pain Score       7           Vitals:    08/23/19 1640   BP: 127/71   Pulse: 86   Patient Position - Orthostatic VS: Sitting         Visual Acuity      ED Medications  Medications - No data to display    Diagnostic Studies  Results Reviewed     None                 No orders to display              Procedures  Procedures       ED Course                               MDM  Number of Diagnoses or Management Options  Acute midline low back pain with left-sided sciatica:   Diagnosis management comments: Acute on chronic low back pain without red flags  Requests flexeril  Has f/u w his surgeon   rted if fever weakness incontinence etc        Disposition  Final diagnoses:   Acute midline low back pain with left-sided sciatica     Time reflects when diagnosis was documented in both MDM as applicable and the Disposition within this note     Time User Action Codes Description Comment    8/23/2019  5:07 PM Judah Benavidez Add [I26 97] Acute midline low back pain with left-sided sciatica       ED Disposition     ED Disposition Condition Date/Time Comment    Discharge Stable Fri Aug 23, 2019  5:07 PM Rebecca Bello discharge to home/self care  Follow-up Information    None         Discharge Medication List as of 8/23/2019  5:08 PM      START taking these medications    Details   !! cyclobenzaprine (FLEXERIL) 10 mg tablet Take 1 tablet (10 mg total) by mouth 2 (two) times a day as needed for muscle spasms, Starting Fri 8/23/2019, Print      predniSONE 20 mg tablet Take 2 tablets (40 mg total) by mouth daily, Starting Fri 8/23/2019, Print       !! - Potential duplicate medications found  Please discuss with provider  CONTINUE these medications which have NOT CHANGED    Details   !! cyclobenzaprine (FLEXERIL) 10 mg tablet Take 10 mg by mouth 3 (three) times a day as needed for muscle spasms, Historical Med      albuterol (PROVENTIL HFA,VENTOLIN HFA) 90 mcg/act inhaler Inhale 2 puffs every 6 (six) hours as needed for wheezing, Starting Wed 12/13/2017, Print       !! - Potential duplicate medications found  Please discuss with provider  No discharge procedures on file      ED Provider  Electronically Signed by           Poncho Ardon MD  08/23/19 5907

## 2020-02-19 ENCOUNTER — HOSPITAL ENCOUNTER (EMERGENCY)
Facility: HOSPITAL | Age: 45
Discharge: HOME | End: 2020-02-19
Attending: EMERGENCY MEDICINE | Admitting: EMERGENCY MEDICINE
Payer: COMMERCIAL

## 2020-02-19 VITALS
DIASTOLIC BLOOD PRESSURE: 75 MMHG | TEMPERATURE: 98.3 F | OXYGEN SATURATION: 100 % | WEIGHT: 203.71 LBS | BODY MASS INDEX: 30.87 KG/M2 | HEIGHT: 68 IN | HEART RATE: 76 BPM | RESPIRATION RATE: 18 BRPM | SYSTOLIC BLOOD PRESSURE: 139 MMHG

## 2020-02-19 DIAGNOSIS — J10.1 INFLUENZA A: Primary | ICD-10-CM

## 2020-02-19 LAB
FLUAV RNA NPH QL NAA+PROBE: DETECTED
FLUBV RNA NPH QL NAA+PROBE: ABNORMAL
RSV RNA NPH QL NAA+PROBE: ABNORMAL

## 2020-02-19 PROCEDURE — 99283 EMERGENCY DEPT VISIT LOW MDM: CPT

## 2020-02-19 PROCEDURE — 87631 RESP VIRUS 3-5 TARGETS: CPT | Performed by: EMERGENCY MEDICINE

## 2020-02-19 PROCEDURE — 99284 EMERGENCY DEPT VISIT MOD MDM: CPT | Performed by: EMERGENCY MEDICINE

## 2020-02-19 RX ORDER — ONDANSETRON 4 MG/1
4 TABLET, ORALLY DISINTEGRATING ORAL ONCE
Status: COMPLETED | OUTPATIENT
Start: 2020-02-19 | End: 2020-02-19

## 2020-02-19 RX ORDER — ACETAMINOPHEN 325 MG/1
975 TABLET ORAL ONCE
Status: COMPLETED | OUTPATIENT
Start: 2020-02-19 | End: 2020-02-19

## 2020-02-19 RX ORDER — IBUPROFEN 400 MG/1
800 TABLET ORAL ONCE
Status: COMPLETED | OUTPATIENT
Start: 2020-02-19 | End: 2020-02-19

## 2020-02-19 RX ADMIN — ACETAMINOPHEN 975 MG: 325 TABLET, FILM COATED ORAL at 17:50

## 2020-02-19 RX ADMIN — ONDANSETRON 4 MG: 4 TABLET, ORALLY DISINTEGRATING ORAL at 17:50

## 2020-02-19 RX ADMIN — IBUPROFEN 800 MG: 400 TABLET ORAL at 17:50

## 2020-02-20 NOTE — ED PROVIDER NOTES
History  Chief Complaint   Patient presents with    Flu Symptoms     Pt reports body aches and chills since saturday      17-year-old male presenting to the emergency department for evaluation of flu-like symptoms  Patient has had about 5 days of progressive body aches and chills  Subjective fevers coughing and nausea as well  No shortness of breath  No lightheadedness or palpitations  Generally feeling unwell  Prior to Admission Medications   Prescriptions Last Dose Informant Patient Reported? Taking? albuterol (PROVENTIL HFA,VENTOLIN HFA) 90 mcg/act inhaler   No No   Sig: Inhale 2 puffs every 6 (six) hours as needed for wheezing   Patient not taking: Reported on 8/23/2019   cyclobenzaprine (FLEXERIL) 10 mg tablet   Yes No   Sig: Take 10 mg by mouth 3 (three) times a day as needed for muscle spasms   cyclobenzaprine (FLEXERIL) 10 mg tablet   No No   Sig: Take 1 tablet (10 mg total) by mouth 2 (two) times a day as needed for muscle spasms   predniSONE 20 mg tablet   No No   Sig: Take 2 tablets (40 mg total) by mouth daily      Facility-Administered Medications: None       History reviewed  No pertinent past medical history  Past Surgical History:   Procedure Laterality Date    BACK SURGERY      HERNIA REPAIR         History reviewed  No pertinent family history  I have reviewed and agree with the history as documented  Social History     Tobacco Use    Smoking status: Current Every Day Smoker     Packs/day: 1 00     Types: Cigarettes    Smokeless tobacco: Never Used   Substance Use Topics    Alcohol use: No    Drug use: No       Review of Systems   Constitutional: Positive for fatigue  Negative for appetite change, chills and fever  HENT: Positive for congestion  Negative for sneezing and sore throat  Eyes: Negative for visual disturbance  Respiratory: Positive for cough  Negative for choking, chest tightness, shortness of breath and wheezing      Cardiovascular: Negative for chest pain and palpitations  Gastrointestinal: Positive for nausea  Negative for abdominal pain, constipation, diarrhea and vomiting  Genitourinary: Negative for difficulty urinating and dysuria  Neurological: Negative for dizziness, weakness, light-headedness, numbness and headaches  All other systems reviewed and are negative  Physical Exam  Physical Exam   Constitutional: He is oriented to person, place, and time  He appears well-developed and well-nourished  No distress  HENT:   Head: Normocephalic and atraumatic  Mouth/Throat: Oropharynx is clear and moist    Eyes: Pupils are equal, round, and reactive to light  EOM are normal    Neck: No JVD present  No tracheal deviation present  Cardiovascular: Normal rate, regular rhythm, normal heart sounds and intact distal pulses  Exam reveals no gallop and no friction rub  No murmur heard  Pulmonary/Chest: Effort normal and breath sounds normal  No respiratory distress  He has no wheezes  He has no rales  Abdominal: Soft  Bowel sounds are normal  He exhibits no distension  There is no tenderness  There is no rebound and no guarding  Neurological: He is alert and oriented to person, place, and time  No cranial nerve deficit  He exhibits normal muscle tone  Skin: Skin is warm and dry  He is not diaphoretic  No pallor  Psychiatric: He has a normal mood and affect  His behavior is normal    Nursing note and vitals reviewed        Vital Signs  ED Triage Vitals [02/19/20 1648]   Temperature Pulse Respirations Blood Pressure SpO2   98 3 °F (36 8 °C) 76 18 139/75 100 %      Temp Source Heart Rate Source Patient Position - Orthostatic VS BP Location FiO2 (%)   Oral Monitor Sitting Left arm --      Pain Score       No Pain           Vitals:    02/19/20 1648   BP: 139/75   Pulse: 76   Patient Position - Orthostatic VS: Sitting         Visual Acuity      ED Medications  Medications   acetaminophen (TYLENOL) tablet 975 mg (975 mg Oral Given 2/19/20 1750) ibuprofen (MOTRIN) tablet 800 mg (800 mg Oral Given 2/19/20 1750)   ondansetron (ZOFRAN-ODT) dispersible tablet 4 mg (4 mg Oral Given 2/19/20 1750)       Diagnostic Studies  Results Reviewed     Procedure Component Value Units Date/Time    Influenza A/B and RSV PCR [[id number]]  (Abnormal) Collected:  02/19/20 1730    Lab Status:  Final result Specimen:  Nose Updated:  02/19/20 1813     INFLUENZA A PCR Detected     INFLUENZA B PCR None Detected     RSV PCR None Detected                 No orders to display              Procedures  Procedures         ED Course                               MDM  Number of Diagnoses or Management Options  Diagnosis management comments: 66-year-old male presenting to emergency department for evaluation of cough fevers chills  Has a influenza like illness, will give NSAIDs, Zofran, check flu swab, reassess  Disposition  Final diagnoses:   Influenza A     Time reflects when diagnosis was documented in both MDM as applicable and the Disposition within this note     Time User Action Codes Description Comment    2/19/2020  7:44 PM Matthew, [facility] [J10 1] Influenza A       ED Disposition     ED Disposition Condition Date/Time Comment    Discharge Stable Wed Feb 19, 2020  7:44 PM Kevin Casanova discharge to home/self care  Follow-up Information     Follow up With Specialties Details Why Contact Info    Papo Kruger DO Internal Medicine   [address] [facility] [address]            Patient's Medications   Discharge Prescriptions    No medications on file     No discharge procedures on file      PDMP Review     None          ED Provider  Electronically Signed by           Eddie Peace MD  02/19/20 1944       Eddie Peace MD  02/19/20 1945

## 2020-09-28 ENCOUNTER — HOSPITAL ENCOUNTER (EMERGENCY)
Facility: HOSPITAL | Age: 45
Discharge: HOME | End: 2020-09-28
Attending: EMERGENCY MEDICINE | Admitting: EMERGENCY MEDICINE
Payer: COMMERCIAL

## 2020-09-28 VITALS
WEIGHT: 203 LBS | TEMPERATURE: 97.8 F | DIASTOLIC BLOOD PRESSURE: 66 MMHG | RESPIRATION RATE: 18 BRPM | OXYGEN SATURATION: 98 % | HEART RATE: 89 BPM | HEIGHT: 68 IN | SYSTOLIC BLOOD PRESSURE: 143 MMHG | BODY MASS INDEX: 30.77 KG/M2

## 2020-09-28 DIAGNOSIS — B02.9 ZOSTER: ICD-10-CM

## 2020-09-28 DIAGNOSIS — R21 RASH AND NONSPECIFIC SKIN ERUPTION: Primary | ICD-10-CM

## 2020-09-28 PROCEDURE — 99284 EMERGENCY DEPT VISIT MOD MDM: CPT | Performed by: EMERGENCY MEDICINE

## 2020-09-28 PROCEDURE — 99282 EMERGENCY DEPT VISIT SF MDM: CPT

## 2020-09-28 RX ORDER — VALACYCLOVIR HYDROCHLORIDE 1 G/1
1000 TABLET, FILM COATED ORAL 3 TIMES DAILY
Qty: 21 TABLET | Refills: 0 | Status: SHIPPED | OUTPATIENT
Start: 2020-09-28 | End: 2020-10-05

## 2020-09-28 RX ORDER — PREDNISONE 20 MG/1
50 TABLET ORAL DAILY
Qty: 18 TABLET | Refills: 0 | Status: SHIPPED | OUTPATIENT
Start: 2020-09-28 | End: 2020-10-05

## 2021-03-18 ENCOUNTER — APPOINTMENT (EMERGENCY)
Dept: CT IMAGING | Facility: HOSPITAL | Age: 46
End: 2021-03-18
Payer: COMMERCIAL

## 2021-03-18 ENCOUNTER — HOSPITAL ENCOUNTER (EMERGENCY)
Facility: HOSPITAL | Age: 46
Discharge: HOME | End: 2021-03-18
Attending: EMERGENCY MEDICINE
Payer: COMMERCIAL

## 2021-03-18 VITALS
RESPIRATION RATE: 16 BRPM | TEMPERATURE: 98.7 F | SYSTOLIC BLOOD PRESSURE: 123 MMHG | OXYGEN SATURATION: 98 % | DIASTOLIC BLOOD PRESSURE: 77 MMHG | HEART RATE: 71 BPM

## 2021-03-18 DIAGNOSIS — K63.89 EPIPLOIC APPENDAGITIS: Primary | ICD-10-CM

## 2021-03-18 LAB
ALBUMIN SERPL BCP-MCNC: 3.6 G/DL (ref 3.5–5)
ALP SERPL-CCNC: 96 U/L (ref 46–116)
ALT SERPL W P-5'-P-CCNC: 23 U/L (ref 12–78)
ANION GAP SERPL CALCULATED.3IONS-SCNC: 7 MMOL/L (ref 4–13)
AST SERPL W P-5'-P-CCNC: 20 U/L (ref 5–45)
BASOPHILS # BLD AUTO: 0.06 THOUSANDS/ΜL (ref 0–0.1)
BASOPHILS NFR BLD AUTO: 1 % (ref 0–1)
BILIRUB SERPL-MCNC: 0.26 MG/DL (ref 0.2–1)
BILIRUB UR QL STRIP: NEGATIVE
BUN SERPL-MCNC: 17 MG/DL (ref 5–25)
CALCIUM SERPL-MCNC: 8.7 MG/DL (ref 8.3–10.1)
CHLORIDE SERPL-SCNC: 104 MMOL/L (ref 100–108)
CLARITY UR: CLEAR
CO2 SERPL-SCNC: 28 MMOL/L (ref 21–32)
COLOR UR: YELLOW
CREAT SERPL-MCNC: 1.02 MG/DL (ref 0.6–1.3)
EOSINOPHIL # BLD AUTO: 0.33 THOUSAND/ΜL (ref 0–0.61)
EOSINOPHIL NFR BLD AUTO: 3 % (ref 0–6)
ERYTHROCYTE [DISTWIDTH] IN BLOOD BY AUTOMATED COUNT: 13.3 % (ref 11.6–15.1)
GFR SERPL CREATININE-BSD FRML MDRD: 87 ML/MIN/1.73SQ M
GLUCOSE SERPL-MCNC: 102 MG/DL (ref 65–140)
GLUCOSE UR STRIP-MCNC: NEGATIVE MG/DL
HCT VFR BLD AUTO: 43.6 % (ref 36.5–49.3)
HGB BLD-MCNC: 14.5 G/DL (ref 12–17)
HGB UR QL STRIP.AUTO: NEGATIVE
IMM GRANULOCYTES # BLD AUTO: 0.04 THOUSAND/UL (ref 0–0.2)
IMM GRANULOCYTES NFR BLD AUTO: 0 % (ref 0–2)
KETONES UR STRIP-MCNC: NEGATIVE MG/DL
LEUKOCYTE ESTERASE UR QL STRIP: NEGATIVE
LIPASE SERPL-CCNC: 105 U/L (ref 73–393)
LYMPHOCYTES # BLD AUTO: 2.52 THOUSANDS/ΜL (ref 0.6–4.47)
LYMPHOCYTES NFR BLD AUTO: 22 % (ref 14–44)
MCH RBC QN AUTO: 31.4 PG (ref 26.8–34.3)
MCHC RBC AUTO-ENTMCNC: 33.3 G/DL (ref 31.4–37.4)
MCV RBC AUTO: 94 FL (ref 82–98)
MONOCYTES # BLD AUTO: 1.02 THOUSAND/ΜL (ref 0.17–1.22)
MONOCYTES NFR BLD AUTO: 9 % (ref 4–12)
NEUTROPHILS # BLD AUTO: 7.69 THOUSANDS/ΜL (ref 1.85–7.62)
NEUTS SEG NFR BLD AUTO: 65 % (ref 43–75)
NITRITE UR QL STRIP: NEGATIVE
NRBC BLD AUTO-RTO: 0 /100 WBCS
PH UR STRIP.AUTO: 5.5 [PH]
PLATELET # BLD AUTO: 234 THOUSANDS/UL (ref 149–390)
PMV BLD AUTO: 10.4 FL (ref 8.9–12.7)
POTASSIUM SERPL-SCNC: 3.9 MMOL/L (ref 3.5–5.3)
PROT SERPL-MCNC: 7.2 G/DL (ref 6.4–8.2)
PROT UR STRIP-MCNC: NEGATIVE MG/DL
RBC # BLD AUTO: 4.62 MILLION/UL (ref 3.88–5.62)
SODIUM SERPL-SCNC: 139 MMOL/L (ref 136–145)
SP GR UR STRIP.AUTO: >=1.03 (ref 1–1.03)
UROBILINOGEN UR QL STRIP.AUTO: 0.2 E.U./DL
WBC # BLD AUTO: 11.66 THOUSAND/UL (ref 4.31–10.16)

## 2021-03-18 PROCEDURE — 74177 CT ABD & PELVIS W/CONTRAST: CPT

## 2021-03-18 PROCEDURE — 81003 URINALYSIS AUTO W/O SCOPE: CPT | Performed by: EMERGENCY MEDICINE

## 2021-03-18 PROCEDURE — 85025 COMPLETE CBC W/AUTO DIFF WBC: CPT | Performed by: EMERGENCY MEDICINE

## 2021-03-18 PROCEDURE — 80053 COMPREHEN METABOLIC PANEL: CPT | Performed by: EMERGENCY MEDICINE

## 2021-03-18 PROCEDURE — 99284 EMERGENCY DEPT VISIT MOD MDM: CPT

## 2021-03-18 PROCEDURE — 83690 ASSAY OF LIPASE: CPT | Performed by: EMERGENCY MEDICINE

## 2021-03-18 PROCEDURE — 36415 COLL VENOUS BLD VENIPUNCTURE: CPT | Performed by: EMERGENCY MEDICINE

## 2021-03-18 PROCEDURE — 99284 EMERGENCY DEPT VISIT MOD MDM: CPT | Performed by: EMERGENCY MEDICINE

## 2021-03-18 RX ORDER — NAPROXEN 500 MG/1
500 TABLET ORAL 2 TIMES DAILY PRN
Qty: 20 TABLET | Refills: 0 | Status: SHIPPED | OUTPATIENT
Start: 2021-03-18

## 2021-03-18 RX ADMIN — IOHEXOL 100 ML: 350 INJECTION, SOLUTION INTRAVENOUS at 06:11

## 2021-03-18 NOTE — ED PROVIDER NOTES
History  Chief Complaint   Patient presents with    Flank Pain     L sided flank pain starting yesterday  Denies changes to urine      50 y o  male presents with 2 days of left sided abdominal pain without radiation  Patient describes severe stabbing pain that started while sitting in the car yesterday and worsened today  Patient states certain positions aggravates the pain and nothing alleviates it  Patient notes a history of a prior tear of min abdominal wall muscle that he states the pain feels somewhat similar to though he denies lifting any heavy objects or any straining  Patient denies vomiting  Patient denies diarrhea  Patient notes last bowel movement was a few hours ago; typical for him  Patient denies testicular pain or penile discharge  ROS: Patient denies fever/chills, dyspnea, chest pain, constipation, diaphoresis, groin pain, dysuria, hematuria, melena, or back/neck pain  All other systems reviewed and negative  Patient denies contacts with similar symptoms  Patient denies any recent use of antibiotics, international travel, or trauma  Patient notes history of prior hernia repair  Focused Objective Exam:  Abdomen:  Inspection of an obese abdomen without previous abdominal surgical incisions noted without erythema, rashes or ecchymosis noted  No abdominal pulsations noted  Normal bowel sounds with no bruit auscultated  Soft abdomen  Palpitation noted tenderness along the left lateral abdomen with no tenderness directly on the anterior aspect; tenderness not over McBurneys point  No masses or pulsatile aorta noted on examination  No rebound or guarding noted on examination, non-peritoneal exam   Negative psoas, obturator, and Rovsings signs  Negative Watsons sign  Back:  No rash or signs of herpes zoster  Skin:  No ecchymosis of the umbilicus (negative [place]s sign) or flank (negative Suggs Osorios sign)  Warm and dry      Medical Decision Making  Abdominal pain with a broad differential   Will establish IV access and make patient NPO considering possibility of surgical intervention required  Will initiate symptomatic management including intravenous fluids  Differential includes significant likelihood of intra-abdominal pathology, including concerns for potential obstruction or diverticulitis  Will obtain CBC to evaluate for anemia and leukocytosis  Will obtain CMP to evaluate electrolytes, renal, biliary, and hepatic function  Will obtain lipase to evaluate for potential pancreatitis  Based on patients age, history, physical exam and presenting complaint, will obtain contrast enhanced CT imaging of patients abdomen and pelvis to further evaluate for possible intraabdominal pathology  History provided by:  Patient  Flank Pain  Pain location: Left lateral abdomen  Pain quality: stabbing    Pain radiates to:  Does not radiate  Pain severity:  Severe  Onset quality:  Sudden  Timing:  Constant  Progression:  Worsening  Relieved by:  Nothing  Worsened by:  Position changes  Associated symptoms: no chest pain, no chills, no constipation, no cough, no diarrhea, no dysuria, no fatigue, no fever, no hematemesis, no hematochezia, no hematuria, no melena, no nausea, no shortness of breath, no sore throat and no vomiting        Prior to Admission Medications   Prescriptions Last Dose Informant Patient Reported? Taking?    albuterol (PROVENTIL HFA,VENTOLIN HFA) 90 mcg/act inhaler   No No   Sig: Inhale 2 puffs every 6 (six) hours as needed for wheezing   Patient not taking: Reported on 8/23/2019   cyclobenzaprine (FLEXERIL) 10 mg tablet   Yes No   Sig: Take 10 mg by mouth 3 (three) times a day as needed for muscle spasms   cyclobenzaprine (FLEXERIL) 10 mg tablet   No No   Sig: Take 1 tablet (10 mg total) by mouth 2 (two) times a day as needed for muscle spasms   predniSONE 20 mg tablet   No No   Sig: Take 2 tablets (40 mg total) by mouth daily   valACYclovir (VALTREX) 1,000 mg tablet   No No   Sig: Take 1 tablet (1,000 mg total) by mouth 3 (three) times a day for 7 days      Facility-Administered Medications: None       History reviewed  No pertinent past medical history  Past Surgical History:   Procedure Laterality Date    BACK SURGERY      HERNIA REPAIR         History reviewed  No pertinent family history  I have reviewed and agree with the history as documented  E-Cigarette/Vaping     E-Cigarette/Vaping Substances     Social History     Tobacco Use    Smoking status: Current Every Day Smoker     Packs/day: 1 00     Types: Cigarettes    Smokeless tobacco: Never Used   Substance Use Topics    Alcohol use: No    Drug use: No       Review of Systems   Constitutional: Negative for chills, fatigue and fever  HENT: Negative for sore throat  Respiratory: Negative for cough and shortness of breath  Cardiovascular: Negative for chest pain  Gastrointestinal: Negative for constipation, diarrhea, hematemesis, hematochezia, melena, nausea and vomiting  Genitourinary: Positive for flank pain  Negative for dysuria and hematuria  All other systems reviewed and are negative  Physical Exam  Physical Exam  Vitals signs reviewed  Constitutional:       Appearance: Normal appearance  HENT:      Head: Normocephalic and atraumatic  Eyes:      General: No scleral icterus  Pupils: Pupils are equal, round, and reactive to light  Neck:      Musculoskeletal: Neck supple  Cardiovascular:      Rate and Rhythm: Normal rate and regular rhythm  Pulses: Normal pulses  Pulmonary:      Effort: Pulmonary effort is normal    Abdominal:      General: There is no distension  Palpations: Abdomen is soft  Tenderness: There is no abdominal tenderness  There is no guarding or rebound  Musculoskeletal:         General: No deformity  Skin:     General: Skin is warm and dry  Neurological:      General: No focal deficit present        Mental Status: He is alert     Psychiatric:         Mood and Affect: Mood normal          Vital Signs  ED Triage Vitals [03/18/21 0506]   Temperature Pulse Respirations Blood Pressure SpO2   98 7 °F (37 1 °C) 95 16 132/84 97 %      Temp Source Heart Rate Source Patient Position - Orthostatic VS BP Location FiO2 (%)   Oral Monitor Standing Left arm --      Pain Score       --           Vitals:    03/18/21 0506 03/18/21 0641   BP: 132/84 123/77   Pulse: 95 71   Patient Position - Orthostatic VS: Standing Lying         Visual Acuity      ED Medications  Medications   iohexol (OMNIPAQUE) 350 MG/ML injection (MULTI-DOSE) 100 mL (100 mL Intravenous Given 3/18/21 0611)       Diagnostic Studies  Results Reviewed     Procedure Component Value Units Date/Time    [facility] [[id number]] Collected: 03/18/21 0521    Lab Status: Final result Specimen: Blood from Arm, Right Updated: 03/18/21 0551     Sodium 139 mmol/L      Potassium 3 9 mmol/L      Chloride 104 mmol/L      CO2 28 mmol/L      ANION GAP 7 mmol/L      BUN 17 mg/dL      Creatinine 1 02 mg/dL      Glucose 102 mg/dL      Calcium 8 7 mg/dL      AST 20 U/L      ALT 23 U/L      Alkaline Phosphatase 96 U/L      Total Protein 7 2 g/dL      Albumin 3 6 g/dL      Total Bilirubin 0 26 mg/dL      eGFR 87 ml/min/1 73sq m     Narrative:      [place] guidelines for Chronic Kidney Disease (CKD):     Stage 1 with normal or high GFR (GFR > 90 mL/min/1 73 square meters)    Stage 2 Mild CKD (GFR = 60-89 mL/min/1 73 square meters)    Stage 3A Moderate CKD (GFR = 45-59 mL/min/1 73 square meters)    Stage 3B Moderate CKD (GFR = 30-44 mL/min/1 73 square meters)    Stage 4 Severe CKD (GFR = 15-29 mL/min/1 73 square meters)    Stage 5 End Stage CKD (GFR <15 mL/min/1 73 square meters)  Note: GFR calculation is accurate only with a steady state creatinine    Lipase [[id number]]  (Normal) Collected: 03/18/21 0521    Lab Status: Final result Specimen: Blood from Arm, Right Updated: 03/18/21 0551     Lipase 105 u/L     UA w Reflex to Microscopic w Reflex to Culture [[id number]] Collected: 03/18/21 0521    Lab Status: Final result Specimen: Urine, Clean Catch Updated: 03/18/21 0543     Color, UA Yellow     Clarity, UA Clear     Specific Gravity, UA >=1 030     pH, UA 5 5     Leukocytes, UA Negative     Nitrite, UA Negative     Protein, UA Negative mg/dl      Glucose, UA Negative mg/dl      Ketones, UA Negative mg/dl      Urobilinogen, UA 0 2 E U /dl      Bilirubin, UA Negative     Blood, UA Negative    CBC and differential [[id number]]  (Abnormal) Collected: 03/18/21 0521    Lab Status: Final result Specimen: Blood from Arm, Right Updated: 03/18/21 0534     WBC 11 66 Thousand/uL      RBC 4 62 Million/uL      Hemoglobin 14 5 g/dL      Hematocrit 43 6 %      MCV 94 fL      MCH 31 4 pg      MCHC 33 3 g/dL      RDW 13 3 %      MPV 10 4 fL      Platelets 909 Thousands/uL      nRBC 0 /100 WBCs      Neutrophils Relative 65 %      Immat GRANS % 0 %      Lymphocytes Relative 22 %      Monocytes Relative 9 %      Eosinophils Relative 3 %      Basophils Relative 1 %      Neutrophils Absolute 7 69 Thousands/µL      Immature Grans Absolute 0 04 Thousand/uL      Lymphocytes Absolute 2 52 Thousands/µL      Monocytes Absolute 1 02 Thousand/µL      Eosinophils Absolute 0 33 Thousand/µL      Basophils Absolute 0 06 Thousands/µL                  CT Abdomen pelvis with contrast   Final Result by Alexia Engel DO (03/18 8229)   1  Acute, epiploic appendagitis involving the descending colon at the junction of the mid and distal descending colon  2   Sigmoid colon diverticulosis  3   Hiatal hernia  The study was marked in [facility] for immediate notification  Workstation performed: [id number]                    Procedures  Procedures         ED Course  ED Course as of Mar 18 0645   Thu Mar 18, 2021   0759 Patient's laboratory evaluation unremarkable    Patient CT with findings consistent with epiploic appendagitis, likely the source of patient's symptoms  Discussed this with the patient, explained the etiology and typical course  Discussed follow-up with primary care symptoms persist   Discussed symptomatic management with anti-inflammatory medications  Discussed return precautions in detail  SBIRT 22yo+      Most Recent Value   SBIRT (22 yo +)   In order to provide better care to our patients, we are screening all of our patients for alcohol and drug use  Would it be okay to ask you these screening questions? Yes Filed at: 03/18/2021 0507   Initial Alcohol Screen: US AUDIT-C    1  How often do you have a drink containing alcohol?  0 Filed at: 03/18/2021 0507   2  How many drinks containing alcohol do you have on a typical day you are drinking? 0 Filed at: 03/18/2021 0507   3a  Male UNDER 65: How often do you have five or more drinks on one occasion? 0 Filed at: 03/18/2021 0507   Audit-C Score  0 Filed at: 03/18/2021 0507   ELEAZAR: How many times in the past year have you    Used an illegal drug or used a prescription medication for non-medical reasons? Never Filed at: 03/18/2021 0507                    MDM    Disposition  Final diagnoses:   Epiploic appendagitis     Time reflects when diagnosis was documented in both MDM as applicable and the Disposition within this note     Time User Action Codes Description Comment    3/18/2021  6:31 AM Rakel Knight [K63 89] Epiploic appendagitis       ED Disposition     ED Disposition Condition Date/Time Comment    Discharge Stable Thu Mar 18, 2021  6:31 AM Ronda Flaherty discharge to home/self care  Follow-up Information     Follow up With Specialties Details Why Contact Info Additional Information    Colletta Mayotte,  Internal Medicine Schedule an appointment as soon as possible for a visit in 3 days Follow-up and reassessment   [address] [facility], [address] [place] [address] Emergency Department Emergency Medicine Go to  If symptoms worsen [facility] Emergency Department, [address] [place] [address], [place], [place], [address]          Patient's Medications   Discharge Prescriptions    NAPROXEN (NAPROSYN) 500 MG TABLET    Take 1 tablet (500 mg total) by mouth 2 (two) times a day as needed for moderate pain for up to 20 doses       Start Date: 3/18/2021 End Date: --       Order Dose: 500 mg       Quantity: 20 tablet    Refills: 0     No discharge procedures on file      PDMP Review     None          ED Provider  Electronically Signed by           Nelsy Ruiz MD  03/18/21 0555

## 2021-03-18 NOTE — DISCHARGE INSTRUCTIONS
CT IMPRESSION:  1  Acute, epiploic appendagitis involving the descending colon at the junction of the mid and distal descending colon  2   Sigmoid colon diverticulosis  3   Hiatal hernia

## 2021-03-18 NOTE — Clinical Note
Nohemy Hurst was seen and treated in our emergency department on 3/18/2021  Diagnosis:     Missy Hewittchristine    He may return on this date: 03/22/2021         If you have any questions or concerns, please don't hesitate to call        Jennifer Jesus RN    ______________________________           _______________          _______________  LORE YORK VICKY [facility] Representative                              Date                                Time

## 2021-04-09 DIAGNOSIS — Z23 ENCOUNTER FOR IMMUNIZATION: ICD-10-CM

## 2021-08-22 PROCEDURE — 99284 EMERGENCY DEPT VISIT MOD MDM: CPT

## 2021-08-23 ENCOUNTER — APPOINTMENT (EMERGENCY)
Dept: RADIOLOGY | Facility: HOSPITAL | Age: 46
End: 2021-08-23
Payer: COMMERCIAL

## 2021-08-23 ENCOUNTER — HOSPITAL ENCOUNTER (EMERGENCY)
Facility: HOSPITAL | Age: 46
Discharge: HOME | End: 2021-08-23
Attending: EMERGENCY MEDICINE | Admitting: EMERGENCY MEDICINE
Payer: COMMERCIAL

## 2021-08-23 VITALS
TEMPERATURE: 98.3 F | SYSTOLIC BLOOD PRESSURE: 129 MMHG | RESPIRATION RATE: 22 BRPM | DIASTOLIC BLOOD PRESSURE: 69 MMHG | OXYGEN SATURATION: 94 % | WEIGHT: 210 LBS | BODY MASS INDEX: 31.93 KG/M2 | HEART RATE: 75 BPM

## 2021-08-23 DIAGNOSIS — R05.9 COUGH: Primary | ICD-10-CM

## 2021-08-23 LAB
ANION GAP SERPL CALCULATED.3IONS-SCNC: 7 MMOL/L (ref 4–13)
ATRIAL RATE: 76 BPM
BASOPHILS # BLD AUTO: 0.08 THOUSANDS/ΜL (ref 0–0.1)
BASOPHILS NFR BLD AUTO: 1 % (ref 0–1)
BUN SERPL-MCNC: 13 MG/DL (ref 5–25)
CALCIUM SERPL-MCNC: 8.4 MG/DL (ref 8.3–10.1)
CHLORIDE SERPL-SCNC: 106 MMOL/L (ref 100–108)
CO2 SERPL-SCNC: 31 MMOL/L (ref 21–32)
CREAT SERPL-MCNC: 1.04 MG/DL (ref 0.6–1.3)
EOSINOPHIL # BLD AUTO: 0.33 THOUSAND/ΜL (ref 0–0.61)
EOSINOPHIL NFR BLD AUTO: 4 % (ref 0–6)
ERYTHROCYTE [DISTWIDTH] IN BLOOD BY AUTOMATED COUNT: 13.2 % (ref 11.6–15.1)
GFR SERPL CREATININE-BSD FRML MDRD: 85 ML/MIN/1.73SQ M
GLUCOSE SERPL-MCNC: 119 MG/DL (ref 65–140)
HCT VFR BLD AUTO: 43.5 % (ref 36.5–49.3)
HGB BLD-MCNC: 14.5 G/DL (ref 12–17)
IMM GRANULOCYTES # BLD AUTO: 0.03 THOUSAND/UL (ref 0–0.2)
IMM GRANULOCYTES NFR BLD AUTO: 0 % (ref 0–2)
LYMPHOCYTES # BLD AUTO: 2.77 THOUSANDS/ΜL (ref 0.6–4.47)
LYMPHOCYTES NFR BLD AUTO: 30 % (ref 14–44)
MCH RBC QN AUTO: 31.6 PG (ref 26.8–34.3)
MCHC RBC AUTO-ENTMCNC: 33.3 G/DL (ref 31.4–37.4)
MCV RBC AUTO: 95 FL (ref 82–98)
MONOCYTES # BLD AUTO: 0.9 THOUSAND/ΜL (ref 0.17–1.22)
MONOCYTES NFR BLD AUTO: 10 % (ref 4–12)
NEUTROPHILS # BLD AUTO: 5.1 THOUSANDS/ΜL (ref 1.85–7.62)
NEUTS SEG NFR BLD AUTO: 55 % (ref 43–75)
NRBC BLD AUTO-RTO: 0 /100 WBCS
P AXIS: 46 DEGREES
PLATELET # BLD AUTO: 241 THOUSANDS/UL (ref 149–390)
PMV BLD AUTO: 10.6 FL (ref 8.9–12.7)
POTASSIUM SERPL-SCNC: 4.1 MMOL/L (ref 3.5–5.3)
PR INTERVAL: 162 MS
QRS AXIS: 2 DEGREES
QRSD INTERVAL: 130 MS
QT INTERVAL: 384 MS
QTC INTERVAL: 432 MS
RBC # BLD AUTO: 4.59 MILLION/UL (ref 3.88–5.62)
SODIUM SERPL-SCNC: 144 MMOL/L (ref 136–145)
T WAVE AXIS: 33 DEGREES
TROPONIN I SERPL-MCNC: <0.02 NG/ML
VENTRICULAR RATE: 76 BPM
WBC # BLD AUTO: 9.21 THOUSAND/UL (ref 4.31–10.16)

## 2021-08-23 PROCEDURE — 85025 COMPLETE CBC W/AUTO DIFF WBC: CPT | Performed by: EMERGENCY MEDICINE

## 2021-08-23 PROCEDURE — 99285 EMERGENCY DEPT VISIT HI MDM: CPT | Performed by: EMERGENCY MEDICINE

## 2021-08-23 PROCEDURE — 93005 ELECTROCARDIOGRAM TRACING: CPT

## 2021-08-23 PROCEDURE — 80048 BASIC METABOLIC PNL TOTAL CA: CPT | Performed by: EMERGENCY MEDICINE

## 2021-08-23 PROCEDURE — 36415 COLL VENOUS BLD VENIPUNCTURE: CPT | Performed by: EMERGENCY MEDICINE

## 2021-08-23 PROCEDURE — 84484 ASSAY OF TROPONIN QUANT: CPT | Performed by: EMERGENCY MEDICINE

## 2021-08-23 PROCEDURE — 71046 X-RAY EXAM CHEST 2 VIEWS: CPT

## 2021-08-23 PROCEDURE — 93010 ELECTROCARDIOGRAM REPORT: CPT | Performed by: INTERNAL MEDICINE

## 2021-08-23 PROCEDURE — 96360 HYDRATION IV INFUSION INIT: CPT

## 2021-08-23 RX ADMIN — SODIUM CHLORIDE 1000 ML: 0.9 INJECTION, SOLUTION INTRAVENOUS at 02:35

## 2021-08-23 NOTE — Clinical Note
Henry Gavin was seen and treated in our emergency department on 8/22/2021  Diagnosis:     Sameer Echavarria  may return to work on return date  He may return on this date: 08/30/2021         If you have any questions or concerns, please don't hesitate to call        Gayle Delgadillo RN    ______________________________           _______________          _______________  [facility] Representative                              Date                                Time

## 2021-08-23 NOTE — ED PROVIDER NOTES
History  Chief Complaint   Patient presents with    Cough     covid + since 8/9, cough since beginning but now has wet cough      Patient is a 47-year-old male past medical history of recently diagnosed COVID-19 presenting with productive cough  Patient states he was diagnosed on 08/09 with COVID-19 in has had nausea, lightheadedness, fevers which have since resolved, cough  He states his cough was initially dry but over the last 3 days has become productive but states he is not able to actually spit it out to assess color  Notes nasal congestion that is improving as well as constant nausea, intermittent lightheadedness which he states is improving since the 12th  Initially was having fevers which have resolved, notes decreased p o  Intake and has not taken any medication for the symptoms  Denies any vomiting or diarrhea, chest pain, shortness breath, back pain, rashes, vision changes, dysuria, abdominal pain, headaches  Prior to Admission Medications   Prescriptions Last Dose Informant Patient Reported? Taking?    albuterol (PROVENTIL HFA,VENTOLIN HFA) 90 mcg/act inhaler Not Taking at Unknown time  No No   Sig: Inhale 2 puffs every 6 (six) hours as needed for wheezing   Patient not taking: Reported on 8/23/2019   cyclobenzaprine (FLEXERIL) 10 mg tablet Not Taking at Unknown time  Yes No   Sig: Take 10 mg by mouth 3 (three) times a day as needed for muscle spasms   Patient not taking: Reported on 8/23/2021   cyclobenzaprine (FLEXERIL) 10 mg tablet Not Taking at Unknown time  No No   Sig: Take 1 tablet (10 mg total) by mouth 2 (two) times a day as needed for muscle spasms   Patient not taking: Reported on 8/23/2021   naproxen (NAPROSYN) 500 mg tablet Not Taking at Unknown time  No No   Sig: Take 1 tablet (500 mg total) by mouth 2 (two) times a day as needed for moderate pain for up to 20 doses   Patient not taking: Reported on 8/23/2021   predniSONE 20 mg tablet Not Taking at Unknown time  No No   Sig: Take 2 tablets (40 mg total) by mouth daily   Patient not taking: Reported on 8/23/2021   valACYclovir (VALTREX) 1,000 mg tablet   No No   Sig: Take 1 tablet (1,000 mg total) by mouth 3 (three) times a day for 7 days      Facility-Administered Medications: None       History reviewed  No pertinent past medical history  Past Surgical History:   Procedure Laterality Date    BACK SURGERY      HERNIA REPAIR         History reviewed  No pertinent family history  I have reviewed and agree with the history as documented  E-Cigarette/Vaping     E-Cigarette/Vaping Substances     Social History     Tobacco Use    Smoking status: Current Every Day Smoker     Packs/day: 1 00     Types: Cigarettes    Smokeless tobacco: Never Used   Substance Use Topics    Alcohol use: No    Drug use: No       Review of Systems   All other systems reviewed and are negative  Physical Exam  Physical Exam  Vitals reviewed  Constitutional:       General: He is not in acute distress  Appearance: Normal appearance  He is not ill-appearing  HENT:      Mouth/Throat:      Mouth: Mucous membranes are moist    Eyes:      Conjunctiva/sclera: Conjunctivae normal    Cardiovascular:      Rate and Rhythm: Normal rate and regular rhythm  Heart sounds: Normal heart sounds  Pulmonary:      Effort: Pulmonary effort is normal       Breath sounds: Normal breath sounds  Abdominal:      General: Abdomen is flat  Palpations: Abdomen is soft  Tenderness: There is no abdominal tenderness  Musculoskeletal:         General: No swelling  Normal range of motion  Cervical back: Neck supple  Right lower leg: No edema  Left lower leg: No edema  Skin:     General: Skin is warm and dry  Neurological:      General: No focal deficit present  Mental Status: He is alert     Psychiatric:         Mood and Affect: Mood normal          Vital Signs  ED Triage Vitals   Temperature Pulse Respirations Blood Pressure SpO2 08/22/21 2145 08/22/21 2145 08/22/21 2145 08/23/21 0124 08/23/21 0124   (!) 97 1 °F (36 2 °C) 91 18 136/77 97 %      Temp Source Heart Rate Source Patient Position - Orthostatic VS BP Location FiO2 (%)   08/22/21 2145 08/22/21 2145 08/22/21 2145 08/22/21 2145 --   Tympanic Monitor Sitting Left arm       Pain Score       --                  Vitals:    08/22/21 2145 08/23/21 0124 08/23/21 0330   BP:  136/77 129/69   Pulse: 91 91 75   Patient Position - Orthostatic VS: Sitting Sitting Lying         Visual Acuity      ED Medications  Medications   sodium chloride 0 9 % bolus 1,000 mL (0 mL Intravenous Stopped 8/23/21 0335)       Diagnostic Studies  Results Reviewed     Procedure Component Value Units Date/Time    Troponin I [[id number]]  (Normal) Collected: 08/23/21 0219    Lab Status: Final result Specimen: Blood from Arm, Right Updated: 08/23/21 0248     Troponin I <0 02 ng/mL     Basic metabolic panel [[id number]] Collected: 08/23/21 0219    Lab Status: Final result Specimen: Blood from Arm, Right Updated: 08/23/21 0240     Sodium 144 mmol/L      Potassium 4 1 mmol/L      Chloride 106 mmol/L      CO2 31 mmol/L      ANION GAP 7 mmol/L      BUN 13 mg/dL      Creatinine 1 04 mg/dL      Glucose 119 mg/dL      Calcium 8 4 mg/dL      eGFR 85 ml/min/1 73sq m     Narrative:      [facility] guidelines for Chronic Kidney Disease (CKD):     Stage 1 with normal or high GFR (GFR > 90 mL/min/1 73 square meters)    Stage 2 Mild CKD (GFR = 60-89 mL/min/1 73 square meters)    Stage 3A Moderate CKD (GFR = 45-59 mL/min/1 73 square meters)    Stage 3B Moderate CKD (GFR = 30-44 mL/min/1 73 square meters)    Stage 4 Severe CKD (GFR = 15-29 mL/min/1 73 square meters)    Stage 5 End Stage CKD (GFR <15 mL/min/1 73 square meters)  Note: GFR calculation is accurate only with a steady state creatinine    CBC and differential [[id number]] Collected: 08/23/21 0219    Lab Status: Final result Specimen: Blood from Arm, Right Updated: 08/23/21 0229     WBC 9 21 Thousand/uL      RBC 4 59 Million/uL      Hemoglobin 14 5 g/dL      Hematocrit 43 5 %      MCV 95 fL      MCH 31 6 pg      MCHC 33 3 g/dL      RDW 13 2 %      MPV 10 6 fL      Platelets 537 Thousands/uL      nRBC 0 /100 WBCs      Neutrophils Relative 55 %      Immat GRANS % 0 %      Lymphocytes Relative 30 %      Monocytes Relative 10 %      Eosinophils Relative 4 %      Basophils Relative 1 %      Neutrophils Absolute 5 10 Thousands/µL      Immature Grans Absolute 0 03 Thousand/uL      Lymphocytes Absolute 2 77 Thousands/µL      Monocytes Absolute 0 90 Thousand/µL      Eosinophils Absolute 0 33 Thousand/µL      Basophils Absolute 0 08 Thousands/µL                  XR chest 2 views   ED Interpretation by Ginger Keith DO (08/23 0215)   NAD                 Procedures  ECG 12 Lead Documentation Only    Date/Time: 8/23/2021 2:26 AM  Performed by: Ginger Keith DO  Authorized by: Ginger Keith DO     ECG reviewed by me, the ED Provider: yes    Patient location:  ED  Previous ECG:     Previous ECG:  Unavailable  Interpretation:     Interpretation: normal    Rate:     ECG rate assessment: normal    Rhythm:     Rhythm: sinus rhythm    Ectopy:     Ectopy: none    QRS:     QRS axis:  Normal    QRS intervals: Wide  Conduction:     Conduction: abnormal      Abnormal conduction: complete RBBB    ST segments:     ST segments:  Normal  T waves:     T waves: normal               ED Course  ED Course as of Aug 23 0402   Mon Aug 23, 2021   0301 Workup unremarkable, have discussed return precautions and PCP follow-up and patient states he understands  SBIRT 22yo+      Most Recent Value   SBIRT (24 yo +)   In order to provide better care to our patients, we are screening all of our patients for alcohol and drug use  Would it be okay to ask you these screening questions?   Yes Filed at: 08/23/2021 0125   Initial Alcohol Screen: US AUDIT-C 1  How often do you have a drink containing alcohol?  0 Filed at: 08/23/2021 0125   2  How many drinks containing alcohol do you have on a typical day you are drinking? 0 Filed at: 08/23/2021 0125   3a  Male UNDER 65: How often do you have five or more drinks on one occasion? 0 Filed at: 08/23/2021 0125   Audit-C Score  0 Filed at: 08/23/2021 0125   ELEAZAR: How many times in the past year have you    Used an illegal drug or used a prescription medication for non-medical reasons? Never Filed at: 08/23/2021 0125                    MDM  Number of Diagnoses or Management Options  Diagnosis management comments: Patient is a 17-year-old male no past medical history presenting with cough in the setting of COVID-19  Patient is well-appearing bedside stable vitals and in no acute distress  Lungs are clear to auscultation, he is afebrile with no recent antipyretic use and have low concern for superimposed pneumonia however will obtain chest x-ray as well as cardiac workup in the setting of dizziness, give fluids and reassess  Disposition  Final diagnoses:   Cough     Time reflects when diagnosis was documented in both MDM as applicable and the Disposition within this note     Time User Action Codes Description Comment    8/23/2021  3:02 AM Kristan Ceja Add [R05] Cough       ED Disposition     ED Disposition Condition Date/Time Comment    Discharge Stable Mon Aug 23, 2021  3:02 AM Ruben Ceballos discharge to home/self care              Follow-up Information     Follow up With Specialties Details Why Contact Solo Smith, DO Internal Medicine In 1 week  [address] [place] [id number]            Discharge Medication List as of 8/23/2021  3:02 AM      CONTINUE these medications which have NOT CHANGED    Details   albuterol (PROVENTIL HFA,VENTOLIN HFA) 90 mcg/act inhaler Inhale 2 puffs every 6 (six) hours as needed for wheezing, Starting Wed 12/13/2017, Print      !! cyclobenzaprine (FLEXERIL) 10 mg tablet Take 10 mg by mouth 3 (three) times a day as needed for muscle spasms, Historical Med      !! cyclobenzaprine (FLEXERIL) 10 mg tablet Take 1 tablet (10 mg total) by mouth 2 (two) times a day as needed for muscle spasms, Starting Fri 8/23/2019, Print      naproxen (NAPROSYN) 500 mg tablet Take 1 tablet (500 mg total) by mouth 2 (two) times a day as needed for moderate pain for up to 20 doses, Starting Thu 3/18/2021, Print      predniSONE 20 mg tablet Take 2 tablets (40 mg total) by mouth daily, Starting Fri 8/23/2019, Print      valACYclovir (VALTREX) 1,000 mg tablet Take 1 tablet (1,000 mg total) by mouth 3 (three) times a day for 7 days, Starting Mon 9/28/2020, Until Mon 10/5/2020, Print       !! - Potential duplicate medications found  Please discuss with provider  No discharge procedures on file      PDMP Review     None          ED Provider  Electronically Signed by           Mary Apple DO  08/23/21 5683

## 2022-05-04 ENCOUNTER — HOSPITAL ENCOUNTER (EMERGENCY)
Facility: HOSPITAL | Age: 47
Discharge: HOME | End: 2022-05-04
Attending: EMERGENCY MEDICINE
Payer: COMMERCIAL

## 2022-05-04 ENCOUNTER — APPOINTMENT (EMERGENCY)
Dept: RADIOLOGY | Facility: HOSPITAL | Age: 47
End: 2022-05-04
Payer: COMMERCIAL

## 2022-05-04 VITALS
BODY MASS INDEX: 30.31 KG/M2 | TEMPERATURE: 97.8 F | HEART RATE: 80 BPM | RESPIRATION RATE: 14 BRPM | WEIGHT: 200 LBS | HEIGHT: 68 IN | OXYGEN SATURATION: 97 % | DIASTOLIC BLOOD PRESSURE: 78 MMHG | SYSTOLIC BLOOD PRESSURE: 136 MMHG

## 2022-05-04 DIAGNOSIS — M79.602 LEFT ARM PAIN: Primary | ICD-10-CM

## 2022-05-04 LAB
ANION GAP SERPL CALCULATED.3IONS-SCNC: 8 MMOL/L (ref 4–13)
BASOPHILS # BLD AUTO: 0.06 THOUSANDS/ΜL (ref 0–0.1)
BASOPHILS NFR BLD AUTO: 1 % (ref 0–1)
BUN SERPL-MCNC: 13 MG/DL (ref 5–25)
CALCIUM SERPL-MCNC: 8.8 MG/DL (ref 8.3–10.1)
CARDIAC TROPONIN I PNL SERPL HS: 3 NG/L
CHLORIDE SERPL-SCNC: 103 MMOL/L (ref 100–108)
CO2 SERPL-SCNC: 29 MMOL/L (ref 21–32)
CREAT SERPL-MCNC: 0.92 MG/DL (ref 0.6–1.3)
EOSINOPHIL # BLD AUTO: 0.23 THOUSAND/ΜL (ref 0–0.61)
EOSINOPHIL NFR BLD AUTO: 2 % (ref 0–6)
ERYTHROCYTE [DISTWIDTH] IN BLOOD BY AUTOMATED COUNT: 13.1 % (ref 11.6–15.1)
GFR SERPL CREATININE-BSD FRML MDRD: 98 ML/MIN/1.73SQ M
GLUCOSE SERPL-MCNC: 95 MG/DL (ref 65–140)
HCT VFR BLD AUTO: 45.5 % (ref 36.5–49.3)
HGB BLD-MCNC: 15.4 G/DL (ref 12–17)
IMM GRANULOCYTES # BLD AUTO: 0.04 THOUSAND/UL (ref 0–0.2)
IMM GRANULOCYTES NFR BLD AUTO: 0 % (ref 0–2)
LYMPHOCYTES # BLD AUTO: 2.82 THOUSANDS/ΜL (ref 0.6–4.47)
LYMPHOCYTES NFR BLD AUTO: 27 % (ref 14–44)
MCH RBC QN AUTO: 32.5 PG (ref 26.8–34.3)
MCHC RBC AUTO-ENTMCNC: 33.8 G/DL (ref 31.4–37.4)
MCV RBC AUTO: 96 FL (ref 82–98)
MONOCYTES # BLD AUTO: 0.8 THOUSAND/ΜL (ref 0.17–1.22)
MONOCYTES NFR BLD AUTO: 8 % (ref 4–12)
NEUTROPHILS # BLD AUTO: 6.61 THOUSANDS/ΜL (ref 1.85–7.62)
NEUTS SEG NFR BLD AUTO: 62 % (ref 43–75)
NRBC BLD AUTO-RTO: 0 /100 WBCS
PLATELET # BLD AUTO: 240 THOUSANDS/UL (ref 149–390)
PMV BLD AUTO: 10 FL (ref 8.9–12.7)
POTASSIUM SERPL-SCNC: 3.5 MMOL/L (ref 3.5–5.3)
RBC # BLD AUTO: 4.74 MILLION/UL (ref 3.88–5.62)
SODIUM SERPL-SCNC: 140 MMOL/L (ref 136–145)
WBC # BLD AUTO: 10.56 THOUSAND/UL (ref 4.31–10.16)

## 2022-05-04 PROCEDURE — 80048 BASIC METABOLIC PNL TOTAL CA: CPT | Performed by: SURGERY

## 2022-05-04 PROCEDURE — 99284 EMERGENCY DEPT VISIT MOD MDM: CPT

## 2022-05-04 PROCEDURE — 99285 EMERGENCY DEPT VISIT HI MDM: CPT | Performed by: SURGERY

## 2022-05-04 PROCEDURE — 85025 COMPLETE CBC W/AUTO DIFF WBC: CPT | Performed by: SURGERY

## 2022-05-04 PROCEDURE — 93005 ELECTROCARDIOGRAM TRACING: CPT

## 2022-05-04 PROCEDURE — 84484 ASSAY OF TROPONIN QUANT: CPT | Performed by: SURGERY

## 2022-05-04 PROCEDURE — 71046 X-RAY EXAM CHEST 2 VIEWS: CPT

## 2022-05-04 PROCEDURE — 36415 COLL VENOUS BLD VENIPUNCTURE: CPT | Performed by: SURGERY

## 2022-05-04 RX ORDER — NAPROXEN 250 MG/1
500 TABLET ORAL ONCE
Status: COMPLETED | OUTPATIENT
Start: 2022-05-04 | End: 2022-05-04

## 2022-05-04 RX ORDER — SODIUM CHLORIDE 9 MG/ML
3 INJECTION INTRAVENOUS
Status: DISCONTINUED | OUTPATIENT
Start: 2022-05-04 | End: 2022-05-05 | Stop reason: HOSPADM

## 2022-05-04 RX ORDER — NAPROXEN 500 MG/1
500 TABLET ORAL 2 TIMES DAILY WITH MEALS
Qty: 10 TABLET | Refills: 0 | Status: SHIPPED | OUTPATIENT
Start: 2022-05-04 | End: 2022-05-09

## 2022-05-04 RX ADMIN — NAPROXEN 500 MG: 250 TABLET ORAL at 21:06

## 2022-05-05 LAB
ATRIAL RATE: 87 BPM
P AXIS: 50 DEGREES
PR INTERVAL: 166 MS
QRS AXIS: -16 DEGREES
QRSD INTERVAL: 124 MS
QT INTERVAL: 374 MS
QTC INTERVAL: 450 MS
T WAVE AXIS: 33 DEGREES
VENTRICULAR RATE: 87 BPM

## 2022-05-05 PROCEDURE — 93010 ELECTROCARDIOGRAM REPORT: CPT | Performed by: INTERNAL MEDICINE

## 2022-05-05 NOTE — ED PROVIDER NOTES
History  Chief Complaint   Patient presents with    Arm Pain     L arm pain starting around 1400 with nausea starting around 1600  denies any other associated symptoms     Balwinder Naidu is a 48 y o  male with no pertinent past medical history presenting today with left-sided arm pain  The patient reports that around 2:00 p m  He began to experience left-sided arm pain with some associated nausea, denies any vomiting  Patient has been constant since onset  Nothing makes the pain better or worse  He denies any diaphoresis, lightheadedness, dizziness, chest pain, shortness of breath, numbness or tingling in the extremity  Patient has not had symptoms like this in the past   He has not taken anything for symptoms  No further complaints at this time  Prior to Admission Medications   Prescriptions Last Dose Informant Patient Reported? Taking? albuterol (PROVENTIL HFA,VENTOLIN HFA) 90 mcg/act inhaler   No No   Sig: Inhale 2 puffs every 6 (six) hours as needed for wheezing   Patient not taking: Reported on 8/23/2019   cyclobenzaprine (FLEXERIL) 10 mg tablet   Yes No   Sig: Take 10 mg by mouth 3 (three) times a day as needed for muscle spasms   Patient not taking: Reported on 8/23/2021   cyclobenzaprine (FLEXERIL) 10 mg tablet   No No   Sig: Take 1 tablet (10 mg total) by mouth 2 (two) times a day as needed for muscle spasms   Patient not taking: Reported on 8/23/2021   naproxen (NAPROSYN) 500 mg tablet   No No   Sig: Take 1 tablet (500 mg total) by mouth 2 (two) times a day as needed for moderate pain for up to 20 doses   Patient not taking: Reported on 8/23/2021   predniSONE 20 mg tablet   No No   Sig: Take 2 tablets (40 mg total) by mouth daily   Patient not taking: Reported on 8/23/2021   valACYclovir (VALTREX) 1,000 mg tablet   No No   Sig: Take 1 tablet (1,000 mg total) by mouth 3 (three) times a day for 7 days      Facility-Administered Medications: None       History reviewed   No pertinent past medical history  Past Surgical History:   Procedure Laterality Date    BACK SURGERY      HERNIA REPAIR         History reviewed  No pertinent family history  I have reviewed and agree with the history as documented  E-Cigarette/Vaping    E-Cigarette Use Never User      E-Cigarette/Vaping Substances     Social History     Tobacco Use    Smoking status: Current Every Day Smoker     Packs/day: 1 00     Types: Cigarettes    Smokeless tobacco: Never Used   Vaping Use    Vaping Use: Never used   Substance Use Topics    Alcohol use: No    Drug use: No       Review of Systems   Constitutional: Negative for activity change, chills, diaphoresis and fever  HENT: Negative for congestion, ear discharge, ear pain, rhinorrhea, sore throat and trouble swallowing  Eyes: Negative for pain, discharge, redness and visual disturbance  Respiratory: Negative for cough, chest tightness, shortness of breath and wheezing  Cardiovascular: Negative for chest pain, palpitations and leg swelling  Gastrointestinal: Negative for abdominal distention, abdominal pain, blood in stool, constipation, diarrhea, nausea and vomiting  Genitourinary: Negative for difficulty urinating, dysuria, flank pain, frequency, hematuria and urgency  Musculoskeletal: Negative for arthralgias, myalgias, neck pain and neck stiffness  Skin: Negative for color change and rash  Neurological: Negative for dizziness, syncope, facial asymmetry, weakness, light-headedness, numbness and headaches  Physical Exam  Physical Exam  Vitals reviewed  Constitutional:       General: He is not in acute distress  Appearance: Normal appearance  He is not ill-appearing  HENT:      Head: Normocephalic and atraumatic  Right Ear: Tympanic membrane normal       Left Ear: Tympanic membrane normal       Nose: Nose normal  No congestion or rhinorrhea  Mouth/Throat:      Mouth: Mucous membranes are moist       Pharynx: Oropharynx is clear  No oropharyngeal exudate or posterior oropharyngeal erythema  Eyes:      Extraocular Movements: Extraocular movements intact  Conjunctiva/sclera: Conjunctivae normal       Pupils: Pupils are equal, round, and reactive to light  Cardiovascular:      Rate and Rhythm: Normal rate and regular rhythm  Pulses: Normal pulses  Heart sounds: Normal heart sounds  Pulmonary:      Effort: Pulmonary effort is normal  No respiratory distress  Breath sounds: Normal breath sounds  No wheezing  Abdominal:      General: Abdomen is flat  Bowel sounds are normal  There is no distension  Palpations: Abdomen is soft  There is no mass  Tenderness: There is no abdominal tenderness  There is no right CVA tenderness, left CVA tenderness or guarding  Hernia: No hernia is present  Musculoskeletal:         General: No swelling, tenderness or deformity  Normal range of motion  Cervical back: Normal range of motion and neck supple  No rigidity or tenderness  Right lower leg: No edema  Left lower leg: No edema  Skin:     General: Skin is warm and dry  Capillary Refill: Capillary refill takes less than 2 seconds  Coloration: Skin is not jaundiced  Findings: No erythema or rash  Neurological:      General: No focal deficit present  Mental Status: He is alert and oriented to person, place, and time  Cranial Nerves: No cranial nerve deficit  Sensory: No sensory deficit  Motor: No weakness        Coordination: Coordination normal       Gait: Gait normal       Deep Tendon Reflexes: Reflexes normal          Vital Signs  ED Triage Vitals   Temperature Pulse Respirations Blood Pressure SpO2   05/04/22 2004 05/04/22 2004 05/04/22 2004 05/04/22 2004 05/04/22 2004   97 8 °F (36 6 °C) 87 19 134/72 98 %      Temp Source Heart Rate Source Patient Position - Orthostatic VS BP Location FiO2 (%)   05/04/22 2004 05/04/22 2004 05/04/22 2004 05/04/22 2004 --   Oral Monitor Sitting Left arm       Pain Score       05/04/22 2005       2           Vitals:    05/04/22 2004 05/04/22 2130   BP: 134/72 136/78   Pulse: 87 80   Patient Position - Orthostatic VS: Sitting Lying         Visual Acuity      ED Medications  Medications   naproxen (NAPROSYN) tablet 500 mg (500 mg Oral Given 5/4/22 2106)       Diagnostic Studies  Results Reviewed     Procedure Component Value Units Date/Time    HS Troponin 0hr (reflex protocol) [[id number]]  (Normal) Collected: 05/04/22 2111    Lab Status: Final result Specimen: Blood from Arm, Right Updated: 05/04/22 2138     hs TnI 0hr 3 ng/L     Basic metabolic panel [[id number]] Collected: 05/04/22 2111    Lab Status: Final result Specimen: Blood from Arm, Right Updated: 05/04/22 2124     Sodium 140 mmol/L      Potassium 3 5 mmol/L      Chloride 103 mmol/L      CO2 29 mmol/L      ANION GAP 8 mmol/L      BUN 13 mg/dL      Creatinine 0 92 mg/dL      Glucose 95 mg/dL      Calcium 8 8 mg/dL      eGFR 98 ml/min/1 73sq m     Narrative:      [facility] guidelines for Chronic Kidney Disease (CKD):     Stage 1 with normal or high GFR (GFR > 90 mL/min/1 73 square meters)    Stage 2 Mild CKD (GFR = 60-89 mL/min/1 73 square meters)    Stage 3A Moderate CKD (GFR = 45-59 mL/min/1 73 square meters)    Stage 3B Moderate CKD (GFR = 30-44 mL/min/1 73 square meters)    Stage 4 Severe CKD (GFR = 15-29 mL/min/1 73 square meters)    Stage 5 End Stage CKD (GFR <15 mL/min/1 73 square meters)  Note: GFR calculation is accurate only with a steady state creatinine    CBC and differential [[id number]]  (Abnormal) Collected: 05/04/22 2111    Lab Status: Final result Specimen: Blood from Arm, Right Updated: 05/04/22 2117     WBC 10 56 Thousand/uL      RBC 4 74 Million/uL      Hemoglobin 15 4 g/dL      Hematocrit 45 5 %      MCV 96 fL      MCH 32 5 pg      MCHC 33 8 g/dL      RDW 13 1 %      MPV 10 0 fL      Platelets 376 Thousands/uL      nRBC 0 /100 WBCs Neutrophils Relative 62 %      Immat GRANS % 0 %      Lymphocytes Relative 27 %      Monocytes Relative 8 %      Eosinophils Relative 2 %      Basophils Relative 1 %      Neutrophils Absolute 6 61 Thousands/µL      Immature Grans Absolute 0 04 Thousand/uL      Lymphocytes Absolute 2 82 Thousands/µL      Monocytes Absolute 0 80 Thousand/µL      Eosinophils Absolute 0 23 Thousand/µL      Basophils Absolute 0 06 Thousands/µL                  X-ray chest 2 views   ED Interpretation by Julienne Wang PA-C (05/04 2148)   No acute cardiopulmonary disease  Awaiting official radiological read  Final Result by Crissy Cheney MD (05/05 6923)      No acute cardiopulmonary disease  Workstation performed: [id number]                    Procedures  Procedures         ED Course             HEART Risk Score      Most Recent Value   Heart Score Risk Calculator    History 1 Filed at: 05/04/2022 2148   ECG 0 Filed at: 05/04/2022 2148   Age 1 Filed at: 05/04/2022 2148   Risk Factors 1 Filed at: 05/04/2022 2148   Troponin 0 Filed at: 05/04/2022 2148   HEART Score 3 Filed at: 05/04/2022 2148                        SBIRT 20yo+      Most Recent Value   SBIRT (23 yo +)    In order to provide better care to our patients, we are screening all of our patients for alcohol and drug use  Would it be okay to ask you these screening questions? Yes Filed at: 05/04/2022 2036   Initial Alcohol Screen: US AUDIT-C     1  How often do you have a drink containing alcohol? 0 Filed at: 05/04/2022 2036   2  How many drinks containing alcohol do you have on a typical day you are drinking? 0 Filed at: 05/04/2022 2036   3a  Male UNDER 65: How often do you have five or more drinks on one occasion? 0 Filed at: 05/04/2022 2036   3b  FEMALE Any Age, or MALE 65+: How often do you have 4 or more drinks on one occassion? 0 Filed at: 05/04/2022 2036   Audit-C Score 0 Filed at: 05/04/2022 2036   ELEAZAR: How many times in the past year have you    Used an illegal drug or used a prescription medication for non-medical reasons? Never Filed at: 05/04/2022 2036                    MDM  Number of Diagnoses or Management Options  Left arm pain: minor  Diagnosis management comments: Patient cardiac workup as above  Discussed findings with the patient at bedside was agreeable to the plan of care  Patient had significant improvement of symptoms with naproxen here in the ER  Strict return criteria were discussed with the patient at bedside  All questions answered appropriately  Recommended close follow-up with the patient's PCP-heart score 3  No further complaints at this time  Amount and/or Complexity of Data Reviewed  Clinical lab tests: reviewed and ordered  Tests in the radiology section of CPT®: ordered and reviewed  Tests in the medicine section of CPT®: ordered and reviewed  Review and summarize past medical records: yes  Discuss the patient with other providers: yes  Independent visualization of images, tracings, or specimens: yes    Risk of Complications, Morbidity, and/or Mortality  Presenting problems: moderate  Diagnostic procedures: moderate  Management options: moderate    Patient Progress  Patient progress: stable      Disposition  Final diagnoses:   Left arm pain     Time reflects when diagnosis was documented in both MDM as applicable and the Disposition within this note     Time User Action Codes Description Comment    5/4/2022  9:48 PM Elder Vigil Add [A31 252] Left arm pain       ED Disposition     ED Disposition Condition Date/Time Comment    Discharge Stable Wed May 4, 2022  9:48 PM Meera Godinez discharge to home/self care              Follow-up Information     Follow up With Specialties Details Why Contact Info Additional Information    Leonela Vasquez,  Internal Medicine Schedule an appointment as soon as possible for a visit in 3 days  [address] [place] [address], [address] [place] [address] Emergency Department Emergency Medicine Go to  If symptoms worsen [address] Emergency Department, [address] [place] [address], [place], [place], [address]          Discharge Medication List as of 5/4/2022  9:49 PM      CONTINUE these medications which have NOT CHANGED    Details   albuterol (PROVENTIL HFA,VENTOLIN HFA) 90 mcg/act inhaler Inhale 2 puffs every 6 (six) hours as needed for wheezing, Starting Wed 12/13/2017, Print      !! cyclobenzaprine (FLEXERIL) 10 mg tablet Take 10 mg by mouth 3 (three) times a day as needed for muscle spasms, Historical Med      !! cyclobenzaprine (FLEXERIL) 10 mg tablet Take 1 tablet (10 mg total) by mouth 2 (two) times a day as needed for muscle spasms, Starting Fri 8/23/2019, Print      naproxen (NAPROSYN) 500 mg tablet Take 1 tablet (500 mg total) by mouth 2 (two) times a day as needed for moderate pain for up to 20 doses, Starting u 3/18/2021, Print      predniSONE 20 mg tablet Take 2 tablets (40 mg total) by mouth daily, Starting Fri 8/23/2019, Print      valACYclovir (VALTREX) 1,000 mg tablet Take 1 tablet (1,000 mg total) by mouth 3 (three) times a day for 7 days, Starting Mon 9/28/2020, Until Mon 10/5/2020, Print       !! - Potential duplicate medications found  Please discuss with provider  No discharge procedures on file      PDMP Review     None          ED Provider  Electronically Signed by           Pennie Caban PA-C  05/05/22 7677

## 2022-06-25 ENCOUNTER — HOSPITAL ENCOUNTER (EMERGENCY)
Facility: HOSPITAL | Age: 47
Discharge: HOME | End: 2022-06-25
Attending: EMERGENCY MEDICINE
Payer: COMMERCIAL

## 2022-06-25 VITALS
RESPIRATION RATE: 17 BRPM | HEART RATE: 81 BPM | OXYGEN SATURATION: 97 % | SYSTOLIC BLOOD PRESSURE: 125 MMHG | TEMPERATURE: 99.5 F | DIASTOLIC BLOOD PRESSURE: 67 MMHG

## 2022-06-25 DIAGNOSIS — T78.2XXA ANAPHYLAXIS, INITIAL ENCOUNTER: Primary | ICD-10-CM

## 2022-06-25 DIAGNOSIS — T63.441A ALLERGIC REACTION TO BEE STING: ICD-10-CM

## 2022-06-25 PROCEDURE — 96375 TX/PRO/DX INJ NEW DRUG ADDON: CPT

## 2022-06-25 PROCEDURE — 99291 CRITICAL CARE FIRST HOUR: CPT | Performed by: EMERGENCY MEDICINE

## 2022-06-25 PROCEDURE — 96374 THER/PROPH/DIAG INJ IV PUSH: CPT

## 2022-06-25 PROCEDURE — 99282 EMERGENCY DEPT VISIT SF MDM: CPT

## 2022-06-25 PROCEDURE — 96372 THER/PROPH/DIAG INJ SC/IM: CPT

## 2022-06-25 RX ORDER — EPINEPHRINE 1 MG/ML
0.3 INJECTION, SOLUTION, CONCENTRATE INTRAVENOUS ONCE
Status: COMPLETED | OUTPATIENT
Start: 2022-06-25 | End: 2022-06-25

## 2022-06-25 RX ORDER — METHYLPREDNISOLONE SODIUM SUCCINATE 125 MG/2ML
125 INJECTION, POWDER, LYOPHILIZED, FOR SOLUTION INTRAMUSCULAR; INTRAVENOUS ONCE
Status: COMPLETED | OUTPATIENT
Start: 2022-06-25 | End: 2022-06-25

## 2022-06-25 RX ORDER — DIPHENHYDRAMINE HYDROCHLORIDE 50 MG/ML
50 INJECTION INTRAMUSCULAR; INTRAVENOUS ONCE
Status: COMPLETED | OUTPATIENT
Start: 2022-06-25 | End: 2022-06-25

## 2022-06-25 RX ORDER — FAMOTIDINE 20 MG/1
20 TABLET, FILM COATED ORAL 2 TIMES DAILY
Qty: 30 TABLET | Refills: 0 | Status: SHIPPED | OUTPATIENT
Start: 2022-06-25

## 2022-06-25 RX ORDER — FAMOTIDINE 10 MG/ML
20 INJECTION, SOLUTION INTRAVENOUS DAILY
Status: DISCONTINUED | OUTPATIENT
Start: 2022-06-26 | End: 2022-06-26 | Stop reason: HOSPADM

## 2022-06-25 RX ORDER — EPINEPHRINE 0.3 MG/.3ML
0.3 INJECTION SUBCUTANEOUS ONCE
Qty: 0.6 ML | Refills: 2 | Status: SHIPPED | OUTPATIENT
Start: 2022-06-25 | End: 2022-06-25

## 2022-06-25 RX ORDER — PREDNISONE 20 MG/1
40 TABLET ORAL DAILY
Qty: 10 TABLET | Refills: 0 | Status: SHIPPED | OUTPATIENT
Start: 2022-06-26

## 2022-06-25 RX ADMIN — DIPHENHYDRAMINE HYDROCHLORIDE 50 MG: 50 INJECTION, SOLUTION INTRAMUSCULAR; INTRAVENOUS at 20:03

## 2022-06-25 RX ADMIN — EPINEPHRINE 0.3 MG: 1 INJECTION, SOLUTION, CONCENTRATE INTRAVENOUS at 20:00

## 2022-06-25 RX ADMIN — METHYLPREDNISOLONE SODIUM SUCCINATE 125 MG: 125 INJECTION, POWDER, FOR SOLUTION INTRAMUSCULAR; INTRAVENOUS at 20:03

## 2022-06-26 NOTE — ED PROVIDER NOTES
History  Chief Complaint   Patient presents with    Bee Sting     Pt was stung by a bee on his face tonight and c/o feeling hot/tingly all over his body, itchy, redness  Pt denies any difficulty breathing at this time      47 yo male without prior h/o significant allergic reaction who presents to ED for evaluation of sudden onset diffuse erythematous rash and abdominal pain with diarrhea which began immediately after being stung in the face by a bee  Presents to ED in distress therefore a complete history cannot be obtained  Epi given at time of presentation with immediate improvement of sxs  Prior to Admission Medications   Prescriptions Last Dose Informant Patient Reported? Taking? albuterol (PROVENTIL HFA,VENTOLIN HFA) 90 mcg/act inhaler   No No   Sig: Inhale 2 puffs every 6 (six) hours as needed for wheezing   Patient not taking: Reported on 8/23/2019   cyclobenzaprine (FLEXERIL) 10 mg tablet   Yes No   Sig: Take 10 mg by mouth 3 (three) times a day as needed for muscle spasms   Patient not taking: Reported on 8/23/2021   cyclobenzaprine (FLEXERIL) 10 mg tablet   No No   Sig: Take 1 tablet (10 mg total) by mouth 2 (two) times a day as needed for muscle spasms   Patient not taking: Reported on 8/23/2021   naproxen (NAPROSYN) 500 mg tablet   No No   Sig: Take 1 tablet (500 mg total) by mouth 2 (two) times a day as needed for moderate pain for up to 20 doses   Patient not taking: Reported on 8/23/2021   naproxen (Naprosyn) 500 mg tablet   No No   Sig: Take 1 tablet (500 mg total) by mouth 2 (two) times a day with meals for 5 days   predniSONE 20 mg tablet   No No   Sig: Take 2 tablets (40 mg total) by mouth daily   Patient not taking: Reported on 8/23/2021   valACYclovir (VALTREX) 1,000 mg tablet   No No   Sig: Take 1 tablet (1,000 mg total) by mouth 3 (three) times a day for 7 days      Facility-Administered Medications: None       History reviewed  No pertinent past medical history      Past Surgical History:   Procedure Laterality Date    BACK SURGERY      HERNIA REPAIR         History reviewed  No pertinent family history  I have reviewed and agree with the history as documented  E-Cigarette/Vaping    E-Cigarette Use Never User      E-Cigarette/Vaping Substances     Social History     Tobacco Use    Smoking status: Current Every Day Smoker     Packs/day: 1 00     Types: Cigarettes    Smokeless tobacco: Never Used   Vaping Use    Vaping Use: Never used   Substance Use Topics    Alcohol use: No    Drug use: No       Review of Systems   Unable to perform ROS: Acuity of condition       Physical Exam  Physical Exam  Vitals and nursing note reviewed  Constitutional:       General: He is in acute distress  Appearance: He is well-developed  He is ill-appearing and diaphoretic  HENT:      Head: Normocephalic and atraumatic  Nose: Nose normal  No congestion  Mouth/Throat:      Mouth: Mucous membranes are moist       Pharynx: Oropharynx is clear  Eyes:      Conjunctiva/sclera: Conjunctivae normal       Pupils: Pupils are equal, round, and reactive to light  Neck:      Vascular: No JVD  Cardiovascular:      Rate and Rhythm: Regular rhythm  Tachycardia present  Heart sounds: Normal heart sounds  No murmur heard  No friction rub  No gallop  Pulmonary:      Effort: Pulmonary effort is normal  No respiratory distress  Breath sounds: Normal breath sounds  No stridor  No wheezing or rales  Abdominal:      General: There is no distension  Palpations: Abdomen is soft  Tenderness: There is no abdominal tenderness  There is no guarding or rebound  Musculoskeletal:         General: No tenderness or deformity  Normal range of motion  Cervical back: Normal range of motion and neck supple  Skin:     General: Skin is warm  Capillary Refill: Capillary refill takes less than 2 seconds  Findings: Erythema and rash present     Neurological:      General: No focal deficit present  Mental Status: He is alert and oriented to person, place, and time  Cranial Nerves: No cranial nerve deficit  Sensory: No sensory deficit  Motor: No weakness or abnormal muscle tone  Coordination: Coordination normal       Gait: Gait normal          Vital Signs  ED Triage Vitals [06/25/22 1949]   Temperature Pulse Respirations Blood Pressure SpO2   99 5 °F (37 5 °C) 102 21 93/54 95 %      Temp Source Heart Rate Source Patient Position - Orthostatic VS BP Location FiO2 (%)   Tympanic Monitor Sitting Left arm --      Pain Score       --           Vitals:    06/25/22 1949 06/25/22 2130   BP: 93/54 125/67   Pulse: 102 81   Patient Position - Orthostatic VS: Sitting Lying         Visual Acuity      ED Medications  Medications   EPINEPHrine PF (ADRENALIN) 1 mg/mL injection 0 3 mg (0 3 mg Intramuscular Given 6/25/22 2000)   methylPREDNISolone sodium succinate (Solu-MEDROL) injection 125 mg (125 mg Intravenous Given 6/25/22 2003)   diphenhydrAMINE (BENADRYL) injection 50 mg (50 mg Intravenous Given 6/25/22 2003)       Diagnostic Studies  Results Reviewed     None                 No orders to display              Procedures  CriticalCare Time  Performed by: Montserrat Holguin MD  Authorized by: Montserrat Holguin MD     Critical care provider statement:     Critical care time (minutes):  35    Critical care time was exclusive of:  Separately billable procedures and treating other patients  Comments:      Treatment of anaphylaxis              ED Course  ED Course as of 06/27/22 2113   Sat Jun 25, 2022 2021 Reexamined  Resting comfortably  Appears improved after epi     2120 Reexamined  Asleep  Normal vitals  SBIRT 20yo+    Flowsheet Row Most Recent Value   SBIRT (23 yo +)    In order to provide better care to our patients, we are screening all of our patients for alcohol and drug use  Would it be okay to ask you these screening questions?  Yes Filed at: 06/25/2022 2011   Initial Alcohol Screen: US AUDIT-C     1  How often do you have a drink containing alcohol? 0 Filed at: 06/25/2022 2011   2  How many drinks containing alcohol do you have on a typical day you are drinking? 0 Filed at: 06/25/2022 2011   3a  Male UNDER 65: How often do you have five or more drinks on one occasion? 0 Filed at: 06/25/2022 2011   Audit-C Score 0 Filed at: 06/25/2022 2011   ELEAZAR: How many times in the past year have you    Used an illegal drug or used a prescription medication for non-medical reasons? Never Filed at: 06/25/2022 2011                    MDM    Disposition  Final diagnoses:   Anaphylaxis, initial encounter   Allergic reaction to bee sting     Time reflects when diagnosis was documented in both MDM as applicable and the Disposition within this note     Time User Action Codes Description Comment    6/25/2022  8:01 PM Uriah Hall 36  2XXA] Anaphylaxis, initial encounter     6/25/2022  8:01 PM Leslie Knight [T99 765L] Allergic reaction to bee sting       ED Disposition     ED Disposition   Discharge    Condition   Stable    Date/Time   Sat Jun 25, 2022  9:49 PM    Comment   Velia Anderson discharge to home/self care  Follow-up Information    None         Discharge Medication List as of 6/25/2022  9:50 PM      START taking these medications    Details   EPINEPHrine (EPIPEN) 0 3 mg/0 3 mL SOAJ Inject 0 3 mL (0 3 mg total) into a muscle once for 1 dose, Starting Sat 6/25/2022, Print      famotidine (PEPCID) 20 mg tablet Take 1 tablet (20 mg total) by mouth 2 (two) times a day, Starting Sat 6/25/2022, Print      !! predniSONE 20 mg tablet Take 2 tablets (40 mg total) by mouth daily, Starting Sun 6/26/2022, Print       !! - Potential duplicate medications found  Please discuss with provider        CONTINUE these medications which have NOT CHANGED    Details   albuterol (PROVENTIL HFA,VENTOLIN HFA) 90 mcg/act inhaler Inhale 2 puffs every 6 (six) hours as needed for wheezing, Starting Wed 12/13/2017, Print      !! cyclobenzaprine (FLEXERIL) 10 mg tablet Take 10 mg by mouth 3 (three) times a day as needed for muscle spasms, Historical Med      !! cyclobenzaprine (FLEXERIL) 10 mg tablet Take 1 tablet (10 mg total) by mouth 2 (two) times a day as needed for muscle spasms, Starting Fri 8/23/2019, Print      naproxen (NAPROSYN) 500 mg tablet Take 1 tablet (500 mg total) by mouth 2 (two) times a day as needed for moderate pain for up to 20 doses, Starting Thu 3/18/2021, Print      !! predniSONE 20 mg tablet Take 2 tablets (40 mg total) by mouth daily, Starting Fri 8/23/2019, Print      valACYclovir (VALTREX) 1,000 mg tablet Take 1 tablet (1,000 mg total) by mouth 3 (three) times a day for 7 days, Starting Mon 9/28/2020, Until Mon 10/5/2020, Print       !! - Potential duplicate medications found  Please discuss with provider  No discharge procedures on file      PDMP Review     None          ED Provider  Electronically Signed by           Korin Rangel MD  06/27/22 5385

## 2023-07-14 ENCOUNTER — HOSPITAL ENCOUNTER (EMERGENCY)
Facility: HOSPITAL | Age: 48
Discharge: HOME | End: 2023-07-14
Attending: EMERGENCY MEDICINE
Payer: COMMERCIAL

## 2023-07-14 VITALS
TEMPERATURE: 97.1 F | OXYGEN SATURATION: 93 % | BODY MASS INDEX: 30.41 KG/M2 | HEART RATE: 82 BPM | DIASTOLIC BLOOD PRESSURE: 54 MMHG | WEIGHT: 200 LBS | RESPIRATION RATE: 20 BRPM | SYSTOLIC BLOOD PRESSURE: 102 MMHG

## 2023-07-14 DIAGNOSIS — T78.2XXA ANAPHYLAXIS, INITIAL ENCOUNTER: Primary | ICD-10-CM

## 2023-07-14 PROCEDURE — 96372 THER/PROPH/DIAG INJ SC/IM: CPT

## 2023-07-14 PROCEDURE — 96374 THER/PROPH/DIAG INJ IV PUSH: CPT

## 2023-07-14 PROCEDURE — 99291 CRITICAL CARE FIRST HOUR: CPT | Performed by: EMERGENCY MEDICINE

## 2023-07-14 PROCEDURE — 99283 EMERGENCY DEPT VISIT LOW MDM: CPT

## 2023-07-14 PROCEDURE — 96375 TX/PRO/DX INJ NEW DRUG ADDON: CPT

## 2023-07-14 PROCEDURE — 93005 ELECTROCARDIOGRAM TRACING: CPT

## 2023-07-14 RX ORDER — PREDNISONE 20 MG/1
60 TABLET ORAL DAILY
Qty: 12 TABLET | Refills: 0 | Status: SHIPPED | OUTPATIENT
Start: 2023-07-14 | End: 2023-07-18

## 2023-07-14 RX ORDER — EPINEPHRINE 1 MG/ML
0.3 INJECTION, SOLUTION, CONCENTRATE INTRAVENOUS ONCE
Status: COMPLETED | OUTPATIENT
Start: 2023-07-14 | End: 2023-07-14

## 2023-07-14 RX ORDER — DIPHENHYDRAMINE HYDROCHLORIDE 50 MG/ML
50 INJECTION INTRAMUSCULAR; INTRAVENOUS ONCE
Status: COMPLETED | OUTPATIENT
Start: 2023-07-14 | End: 2023-07-14

## 2023-07-14 RX ORDER — FAMOTIDINE 10 MG/ML
20 INJECTION, SOLUTION INTRAVENOUS ONCE
Status: COMPLETED | OUTPATIENT
Start: 2023-07-14 | End: 2023-07-14

## 2023-07-14 RX ORDER — METHYLPREDNISOLONE SODIUM SUCCINATE 125 MG/2ML
125 INJECTION, POWDER, LYOPHILIZED, FOR SOLUTION INTRAMUSCULAR; INTRAVENOUS ONCE
Status: COMPLETED | OUTPATIENT
Start: 2023-07-14 | End: 2023-07-14

## 2023-07-14 RX ADMIN — METHYLPREDNISOLONE SODIUM SUCCINATE 125 MG: 125 INJECTION, POWDER, FOR SOLUTION INTRAMUSCULAR; INTRAVENOUS at 18:13

## 2023-07-14 RX ADMIN — EPINEPHRINE 0.3 MG: 1 INJECTION, SOLUTION, CONCENTRATE INTRAVENOUS at 18:04

## 2023-07-14 RX ADMIN — FAMOTIDINE 20 MG: 10 INJECTION, SOLUTION INTRAVENOUS at 18:14

## 2023-07-14 RX ADMIN — DIPHENHYDRAMINE HYDROCHLORIDE 50 MG: 50 INJECTION, SOLUTION INTRAMUSCULAR; INTRAVENOUS at 18:14

## 2023-07-14 NOTE — Clinical Note
Garth Elisa was seen and treated in our emergency department on 7/14/2023. No restrictions            Diagnosis:     Ashli Awan  may return to work on return date. He may return on this date: 07/17/2023         If you have any questions or concerns, please don't hesitate to call.       Flaco Del Rio, DO    ______________________________           _______________          _______________  Hospital Representative                              Date                                Time

## 2023-07-15 LAB
ATRIAL RATE: 101 BPM
P AXIS: 66 DEGREES
PR INTERVAL: 146 MS
QRS AXIS: 36 DEGREES
QRSD INTERVAL: 114 MS
QT INTERVAL: 364 MS
QTC INTERVAL: 471 MS
T WAVE AXIS: 53 DEGREES
VENTRICULAR RATE: 101 BPM

## 2023-07-15 PROCEDURE — 93010 ELECTROCARDIOGRAM REPORT: CPT | Performed by: INTERNAL MEDICINE

## 2023-07-15 NOTE — ED NOTES
Alert oriented in no acute distress. Discharged to home no c/o offered.      Jose Juan Lin RN  07/14/23 7098

## 2023-07-15 NOTE — ED PROVIDER NOTES
History  Chief Complaint   Patient presents with   • Allergic Reaction     States he got stung in the face by a bee about 20 min ago, c/o burning in the face feet, legs. 43-year-old male presents to the emergency room for evaluation after a bee sting that occurred about 20 minutes ago. Patient states that he was outside and got stung by a bee to his face. He states that he has had burning to his hands and feet since. Denies any sob, wheeze, n/v, facial swleling, or rash. States that his throat "feels weird."  Denies any swelling or difficulty swallowing. He also reports abdominal cramping but denies any nausea/vomiting. He states that he was seen here 1 year ago for similar and was treated with epinephrine. States that he does have an EpiPen at home but did not have this on him at the time. Has not taken any medications for the symptoms. No other complaints. History provided by:  Patient  Allergic Reaction  Presenting symptoms: no difficulty breathing, no difficulty swallowing, no rash, no swelling and no wheezing    Severity:  Moderate  Prior allergic episodes:  Insect allergies  Context: insect bite/sting    Relieved by:  None tried  Worsened by:  Nothing  Ineffective treatments:  None tried      Prior to Admission Medications   Prescriptions Last Dose Informant Patient Reported? Taking?    EPINEPHrine (EPIPEN) 0.3 mg/0.3 mL SOAJ   No No   Sig: Inject 0.3 mL (0.3 mg total) into a muscle once for 1 dose   albuterol (PROVENTIL HFA,VENTOLIN HFA) 90 mcg/act inhaler   No No   Sig: Inhale 2 puffs every 6 (six) hours as needed for wheezing   Patient not taking: Reported on 8/23/2019   cyclobenzaprine (FLEXERIL) 10 mg tablet   Yes No   Sig: Take 10 mg by mouth 3 (three) times a day as needed for muscle spasms   Patient not taking: Reported on 8/23/2021   cyclobenzaprine (FLEXERIL) 10 mg tablet   No No   Sig: Take 1 tablet (10 mg total) by mouth 2 (two) times a day as needed for muscle spasms   Patient not taking: Reported on 8/23/2021   famotidine (PEPCID) 20 mg tablet   No No   Sig: Take 1 tablet (20 mg total) by mouth 2 (two) times a day   naproxen (NAPROSYN) 500 mg tablet   No No   Sig: Take 1 tablet (500 mg total) by mouth 2 (two) times a day as needed for moderate pain for up to 20 doses   Patient not taking: Reported on 8/23/2021   predniSONE 20 mg tablet   No No   Sig: Take 2 tablets (40 mg total) by mouth daily   Patient not taking: Reported on 8/23/2021   predniSONE 20 mg tablet   No No   Sig: Take 2 tablets (40 mg total) by mouth daily   valACYclovir (VALTREX) 1,000 mg tablet   No No   Sig: Take 1 tablet (1,000 mg total) by mouth 3 (three) times a day for 7 days      Facility-Administered Medications: None       History reviewed. No pertinent past medical history. Past Surgical History:   Procedure Laterality Date   • BACK SURGERY     • HERNIA REPAIR         History reviewed. No pertinent family history. I have reviewed and agree with the history as documented. E-Cigarette/Vaping   • E-Cigarette Use Never User      E-Cigarette/Vaping Substances     Social History     Tobacco Use   • Smoking status: Every Day     Packs/day: 1.00     Types: Cigarettes   • Smokeless tobacco: Never   Vaping Use   • Vaping Use: Never used   Substance Use Topics   • Alcohol use: No   • Drug use: No       Review of Systems   Constitutional: Negative for chills and fever. HENT: Negative for congestion, ear pain, sore throat and trouble swallowing. Eyes: Negative for pain and visual disturbance. Respiratory: Negative for cough, shortness of breath and wheezing. Cardiovascular: Negative for chest pain and leg swelling. Gastrointestinal: Positive for abdominal pain. Negative for diarrhea, nausea and vomiting. Genitourinary: Negative for dysuria, frequency, hematuria and urgency. Musculoskeletal: Negative for neck pain and neck stiffness. Skin: Negative for rash and wound.    Neurological: Negative for weakness, numbness and headaches. Psychiatric/Behavioral: Negative for agitation and confusion. All other systems reviewed and are negative. Physical Exam  Physical Exam  Vitals and nursing note reviewed. Constitutional:       Appearance: He is well-developed. HENT:      Head: Normocephalic and atraumatic. Comments: Small insect bite to the left cheek. No retained stinger seen. No angioedema. No drooling or difficulty swallowing. Eyes:      Pupils: Pupils are equal, round, and reactive to light. Cardiovascular:      Rate and Rhythm: Normal rate and regular rhythm. Pulmonary:      Effort: Pulmonary effort is normal.      Breath sounds: Normal breath sounds. No wheezing, rhonchi or rales. Abdominal:      General: Bowel sounds are normal.      Palpations: Abdomen is soft. Tenderness: There is no abdominal tenderness. Musculoskeletal:         General: Normal range of motion. Cervical back: Normal range of motion and neck supple. Skin:     General: Skin is warm and dry. Neurological:      General: No focal deficit present. Mental Status: He is alert and oriented to person, place, and time.       Comments: No focal deficits         Vital Signs  ED Triage Vitals [07/14/23 1726]   Temperature Pulse Respirations Blood Pressure SpO2   (!) 97.1 °F (36.2 °C) (!) 107 20 110/53 96 %      Temp Source Heart Rate Source Patient Position - Orthostatic VS BP Location FiO2 (%)   Temporal Monitor Sitting Left arm --      Pain Score       --           Vitals:    07/14/23 1726 07/14/23 1830   BP: 110/53 113/65   Pulse: (!) 107 86   Patient Position - Orthostatic VS: Sitting          Visual Acuity      ED Medications  Medications   EPINEPHrine PF (ADRENALIN) 1 mg/mL injection 0.3 mg (0.3 mg Intramuscular Given 7/14/23 1804)   diphenhydrAMINE (BENADRYL) injection 50 mg (50 mg Intravenous Given 7/14/23 1814)   methylPREDNISolone sodium succinate (Solu-MEDROL) injection 125 mg (125 mg Intravenous Given 7/14/23 1813)   Famotidine (PF) (PEPCID) injection 20 mg (20 mg Intravenous Given 7/14/23 1814)       Diagnostic Studies  Results Reviewed     None                 No orders to display              Procedures  CriticalCare Time    Date/Time: 7/14/2023 9:22 PM    Performed by: Marce Lombard, DO  Authorized by: Marce Lombard, DO    Critical care provider statement:     Critical care time (minutes):  40    Critical care was necessary to treat or prevent imminent or life-threatening deterioration of the following conditions:  Circulatory failure, respiratory failure and metabolic crisis  Comments:      Treatment of anaphylaxis             ED Course                               SBIRT 20yo+    Flowsheet Row Most Recent Value   Initial Alcohol Screen: US AUDIT-C     1. How often do you have a drink containing alcohol? 0 Filed at: 07/14/2023 1848   2. How many drinks containing alcohol do you have on a typical day you are drinking? 0 Filed at: 07/14/2023 1848   3a. Male UNDER 65: How often do you have five or more drinks on one occasion? 0 Filed at: 07/14/2023 1848   Audit-C Score 0 Filed at: 07/14/2023 1848   ELEAZAR: How many times in the past year have you. .. Used an illegal drug or used a prescription medication for non-medical reasons? Never Filed at: 07/14/2023 1848                    Medical Decision Making  59-year-old male with anaphylaxis-we will treat with epi, steroids, Benadryl, and Pepcid. Will observe in the emergency department. Patient states that he has an EpiPen at home. Instructed to carry it around him especially when he is outside. Anaphylaxis, initial encounter: acute illness or injury  Risk  Prescription drug management.           Disposition  Final diagnoses:   Anaphylaxis, initial encounter     Time reflects when diagnosis was documented in both MDM as applicable and the Disposition within this note     Time User Action Codes Description Comment    7/14/2023  9:12 PM Marce Lombard A Add [T78. 2XXA] Anaphylaxis, initial encounter       ED Disposition     ED Disposition   Discharge    Condition   Stable    Date/Time   Fri Jul 14, 2023  9:12 PM    Comment   Vera Huston discharge to home/self care. Follow-up Information     Follow up With Specialties Details Why Contact Info Additional Information    Zandra Greenfield DO Internal Medicine Call in 1 day For follow-up within 2 to 3 days. [address] Emergency Department Emergency Medicine Go to  Immediately for any new or worsening symptoms [address] [facility] [phone] [address] [facility] Emergency Department, [place], [place], [address]          Patient's Medications   Discharge Prescriptions    PREDNISONE 20 MG TABLET    Take 3 tablets (60 mg total) by mouth daily for 4 days       Start Date: 7/14/2023 End Date: 7/18/2023       Order Dose: 60 mg       Quantity: 12 tablet    Refills: 0       No discharge procedures on file.     PDMP Review     None          ED Provider  Electronically Signed by           Raphael Abreu DO  07/14/23 2246

## 2024-01-26 ENCOUNTER — HOSPITAL ENCOUNTER (EMERGENCY)
Facility: HOSPITAL | Age: 49
Discharge: HOME | End: 2024-01-26
Attending: STUDENT IN AN ORGANIZED HEALTH CARE EDUCATION/TRAINING PROGRAM
Payer: COMMERCIAL

## 2024-01-26 VITALS
RESPIRATION RATE: 18 BRPM | DIASTOLIC BLOOD PRESSURE: 74 MMHG | BODY MASS INDEX: 30.31 KG/M2 | SYSTOLIC BLOOD PRESSURE: 131 MMHG | HEART RATE: 89 BPM | HEIGHT: 68 IN | TEMPERATURE: 97.8 F | OXYGEN SATURATION: 96 % | WEIGHT: 200 LBS

## 2024-01-26 DIAGNOSIS — J06.9 UPPER RESPIRATORY INFECTION: Primary | ICD-10-CM

## 2024-01-26 LAB
FLUAV RNA RESP QL NAA+PROBE: NEGATIVE
FLUBV RNA RESP QL NAA+PROBE: NEGATIVE
RSV RNA RESP QL NAA+PROBE: NEGATIVE
SARS-COV-2 RNA RESP QL NAA+PROBE: NEGATIVE

## 2024-01-26 PROCEDURE — 99283 EMERGENCY DEPT VISIT LOW MDM: CPT

## 2024-01-26 PROCEDURE — 99284 EMERGENCY DEPT VISIT MOD MDM: CPT | Performed by: STUDENT IN AN ORGANIZED HEALTH CARE EDUCATION/TRAINING PROGRAM

## 2024-01-26 PROCEDURE — 0241U HB NFCT DS VIR RESP RNA 4 TRGT: CPT | Performed by: STUDENT IN AN ORGANIZED HEALTH CARE EDUCATION/TRAINING PROGRAM

## 2024-01-26 NOTE — Clinical Note
Nic Jaen was seen and treated in our emergency department on 1/26/2024.                Diagnosis:     Nic  may return to work on return date.    He may return on this date: 01/30/2024         If you have any questions or concerns, please don't hesitate to call.      Gaby Traylor, DO    ______________________________           _______________          _______________  Hospital Representative                              Date                                Time

## 2024-01-26 NOTE — Clinical Note
Nic Jean was seen and treated in our emergency department on 1/26/2024.                Diagnosis:     Nic  may return to work on return date.    He may return on this date: 01/30/2024    You can continue use over-the-counter medications to help with treatments.  You can try Mucinex DM, DayQuil/NyQuil, Sudafed head congestion.  You can take Robitussin for any cough.  You can try lozenges or tea and honey.    Follow-up with primary care as needed for reevaluation.    Turn if you have new or worsening symptoms such as chest pain, shortness of breath or difficulty breathing.     If you have any questions or concerns, please don't hesitate to call.      Gaby Traylor, DO    ______________________________           _______________          _______________  Hospital Representative                              Date                                Time

## 2024-01-27 NOTE — DISCHARGE INSTRUCTIONS
You can continue use over-the-counter medications to help with treatments.  You can try Mucinex DM, DayQuil/NyQuil, Sudafed head congestion.  You can take Robitussin for any cough.  You can try lozenges or tea and honey.    Follow-up with primary care as needed for reevaluation.    Return if you have new or worsening symptoms such as chest pain, shortness of breath or difficulty breathing.

## 2024-02-04 NOTE — ED PROVIDER NOTES
History  Chief Complaint   Patient presents with    Cough     C/o cough, congestion since Thursday.      HPI    Patient is a 48-year-old male presenting for department with concerns of persistent congestion.  Patient had a cough.  Is been going on for the last few days.  Denies any recent fevers or chills.  Nausea vomiting or change in bowel bladder habits.  No pertinent past medical history.  Everyday smoker.    Prior to Admission Medications   Prescriptions Last Dose Informant Patient Reported? Taking?   EPINEPHrine (EPIPEN) 0.3 mg/0.3 mL SOAJ   No No   Sig: Inject 0.3 mL (0.3 mg total) into a muscle once for 1 dose   albuterol (PROVENTIL HFA,VENTOLIN HFA) 90 mcg/act inhaler   No No   Sig: Inhale 2 puffs every 6 (six) hours as needed for wheezing   Patient not taking: Reported on 8/23/2019   cyclobenzaprine (FLEXERIL) 10 mg tablet   Yes No   Sig: Take 10 mg by mouth 3 (three) times a day as needed for muscle spasms   Patient not taking: Reported on 8/23/2021   cyclobenzaprine (FLEXERIL) 10 mg tablet   No No   Sig: Take 1 tablet (10 mg total) by mouth 2 (two) times a day as needed for muscle spasms   Patient not taking: Reported on 8/23/2021   famotidine (PEPCID) 20 mg tablet   No No   Sig: Take 1 tablet (20 mg total) by mouth 2 (two) times a day   naproxen (NAPROSYN) 500 mg tablet   No No   Sig: Take 1 tablet (500 mg total) by mouth 2 (two) times a day as needed for moderate pain for up to 20 doses   Patient not taking: Reported on 8/23/2021   predniSONE 20 mg tablet   No No   Sig: Take 2 tablets (40 mg total) by mouth daily   Patient not taking: Reported on 8/23/2021   predniSONE 20 mg tablet   No No   Sig: Take 2 tablets (40 mg total) by mouth daily   valACYclovir (VALTREX) 1,000 mg tablet   No No   Sig: Take 1 tablet (1,000 mg total) by mouth 3 (three) times a day for 7 days      Facility-Administered Medications: None       History reviewed. No pertinent past medical history.    Past Surgical History:    Procedure Laterality Date    BACK SURGERY      HERNIA REPAIR         History reviewed. No pertinent family history.  I have reviewed and agree with the history as documented.    E-Cigarette/Vaping    E-Cigarette Use Never User      E-Cigarette/Vaping Substances     Social History     Tobacco Use    Smoking status: Every Day     Current packs/day: 1.00     Types: Cigarettes    Smokeless tobacco: Never   Vaping Use    Vaping status: Never Used   Substance Use Topics    Alcohol use: No    Drug use: No       Review of Systems   Constitutional:  Negative for chills and fever.   HENT:  Positive for congestion. Negative for ear pain and sore throat.    Eyes:  Negative for pain and visual disturbance.   Respiratory:  Positive for cough. Negative for shortness of breath.    Cardiovascular:  Negative for chest pain and palpitations.   Gastrointestinal:  Negative for abdominal pain and vomiting.   Genitourinary:  Negative for dysuria and hematuria.   Musculoskeletal:  Negative for arthralgias and back pain.   Skin:  Negative for color change and rash.   Neurological:  Negative for seizures and syncope.   All other systems reviewed and are negative.      Physical Exam  Physical Exam  Vitals and nursing note reviewed.   Constitutional:       General: He is not in acute distress.     Appearance: He is well-developed.   HENT:      Head: Normocephalic and atraumatic.      Right Ear: Tympanic membrane and ear canal normal.      Left Ear: Tympanic membrane and ear canal normal.      Nose: Congestion present.      Mouth/Throat:      Mouth: Mucous membranes are moist.      Pharynx: Oropharynx is clear.   Eyes:      Extraocular Movements: Extraocular movements intact.      Conjunctiva/sclera: Conjunctivae normal.      Pupils: Pupils are equal, round, and reactive to light.   Cardiovascular:      Rate and Rhythm: Normal rate and regular rhythm.      Heart sounds: No murmur heard.  Pulmonary:      Effort: Pulmonary effort is normal. No  respiratory distress.      Breath sounds: Normal breath sounds.   Abdominal:      Palpations: Abdomen is soft.      Tenderness: There is no abdominal tenderness.   Musculoskeletal:         General: No swelling.      Cervical back: Neck supple.   Skin:     General: Skin is warm and dry.      Capillary Refill: Capillary refill takes less than 2 seconds.   Neurological:      General: No focal deficit present.      Mental Status: He is alert and oriented to person, place, and time.   Psychiatric:         Mood and Affect: Mood normal.         Vital Signs  ED Triage Vitals [01/26/24 1923]   Temperature Pulse Respirations Blood Pressure SpO2   97.8 °F (36.6 °C) 89 18 131/74 96 %      Temp Source Heart Rate Source Patient Position - Orthostatic VS BP Location FiO2 (%)   Oral -- -- -- --      Pain Score       --           Vitals:    01/26/24 1923   BP: 131/74   Pulse: 89         Visual Acuity      ED Medications  Medications - No data to display    Diagnostic Studies  Results Reviewed       Procedure Component Value Units Date/Time    FLU/RSV/COVID - if FLU/RSV clinically relevant [[id number]]  (Normal) Collected: 01/26/24 2025    Lab Status: Final result Specimen: Nares from Nose Updated: 01/26/24 2110     SARS-CoV-2 Negative     INFLUENZA A PCR Negative     INFLUENZA B PCR Negative     RSV PCR Negative    Narrative:      FOR PEDIATRIC PATIENTS - copy/paste COVID Guidelines URL to browser: [email or web address]    SARS-CoV-2 assay is a Nucleic Acid Amplification assay intended for the  qualitative detection of nucleic acid from SARS-CoV-2 in nasopharyngeal  swabs. Results are for the presumptive identification of SARS-CoV-2 RNA.    Positive results are indicative of infection with SARS-CoV-2, the virus  causing COVID-19, but do not rule out bacterial infection or co-infection  with other viruses. Laboratories within the [place] and its  territories are required to  report all positive results to the appropriate  public health authorities. Negative results do not preclude SARS-CoV-2  infection and should not be used as the sole basis for treatment or other  patient management decisions. Negative results must be combined with  clinical observations, patient history, and epidemiological information.  This test has not been FDA cleared or approved.    This test has been authorized by FDA under an Emergency Use Authorization  (EUA). This test is only authorized for the duration of time the  declaration that circumstances exist justifying the authorization of the  emergency use of an in vitro diagnostic tests for detection of SARS-CoV-2  virus and/or diagnosis of COVID-19 infection under section 564(b)(1) of  the Act, 21 U.S.C. 360bbb-3(b)(1), unless the authorization is terminated  or revoked sooner. The test has been validated but independent review by FDA  and CLIA is pending.    Test performed using [employer] GeneXpert: This RT-PCR assay targets N2,  a region unique to SARS-CoV-2. A conserved region in the E-gene was chosen  for pan-Sarbecovirus detection which includes SARS-CoV-2.    According to CMS-2020-01-R, this platform meets the definition of high-throughput technology.                   No orders to display              Procedures  Procedures         ED Course                               SBIRT 20yo+      Flowsheet Row Most Recent Value   Initial Alcohol Screen: US AUDIT-C     1. How often do you have a drink containing alcohol? 0 Filed at: 01/26/2024 1925   2. How many drinks containing alcohol do you have on a typical day you are drinking?  0 Filed at: 01/26/2024 1925   3a. Male UNDER 65: How often do you have five or more drinks on one occasion? 0 Filed at: 01/26/2024 1925   3b. FEMALE Any Age, or MALE 65+: How often do you have 4 or more drinks on one occassion? 0 Filed at: 01/26/2024 1925   Audit-C Score 0 Filed at: 01/26/2024 1925   ELEAZAR: How many times in the past  year have you...    Used an illegal drug or used a prescription medication for non-medical reasons? Never Filed at: 01/26/2024 1925                      Medical Decision Making  Differential viral syndrome, bronchitis, pneumonia.    Patient is a well-appearing 48-year-old male presenting emerged from in no acute respiratory distress and vital signs overall unremarkable.    Patient was tested for flu COVID RSV which is negative.    Discussed symptomatic management as well as return follow-up precautions.  Patient verbalized understanding.  Disposition discharge             Disposition  Final diagnoses:   Upper respiratory infection     Time reflects when diagnosis was documented in both MDM as applicable and the Disposition within this note       Time User Action Codes Description Comment    1/26/2024  9:37 PM Gaby Traylor Add [J06.9] Upper respiratory infection           ED Disposition       ED Disposition   Discharge    Condition   Stable    Date/Time   Fri Jan 26, 2024 2137    Comment   Nic Jean discharge to home/self care.                   Follow-up Information       Follow up With Specialties Details Why Contact Info    Tip Gonsalves, DO Internal Medicine   [address] [facility], [address]  [address] [facility] [address]              Discharge Medication List as of 1/26/2024  9:42 PM        CONTINUE these medications which have NOT CHANGED    Details   albuterol (PROVENTIL HFA,VENTOLIN HFA) 90 mcg/act inhaler Inhale 2 puffs every 6 (six) hours as needed for wheezing, Starting Wed 12/13/2017, Print      !! cyclobenzaprine (FLEXERIL) 10 mg tablet Take 10 mg by mouth 3 (three) times a day as needed for muscle spasms, Historical Med      !! cyclobenzaprine (FLEXERIL) 10 mg tablet Take 1 tablet (10 mg total) by mouth 2 (two) times a day as needed for muscle spasms, Starting Fri 8/23/2019, Print      EPINEPHrine (EPIPEN) 0.3 mg/0.3 mL SOAJ Inject 0.3 mL (0.3 mg total) into a muscle once for 1 dose, Starting Sat  6/25/2022, Print      famotidine (PEPCID) 20 mg tablet Take 1 tablet (20 mg total) by mouth 2 (two) times a day, Starting Sat 6/25/2022, Print      naproxen (NAPROSYN) 500 mg tablet Take 1 tablet (500 mg total) by mouth 2 (two) times a day as needed for moderate pain for up to 20 doses, Starting Thu 3/18/2021, Print      !! predniSONE 20 mg tablet Take 2 tablets (40 mg total) by mouth daily, Starting Fri 8/23/2019, Print      !! predniSONE 20 mg tablet Take 2 tablets (40 mg total) by mouth daily, Starting Sun 6/26/2022, Print      valACYclovir (VALTREX) 1,000 mg tablet Take 1 tablet (1,000 mg total) by mouth 3 (three) times a day for 7 days, Starting Mon 9/28/2020, Until Mon 10/5/2020, Print       !! - Potential duplicate medications found. Please discuss with provider.          No discharge procedures on file.    PDMP Review       None            ED Provider  Electronically Signed by             Gaby Traylor DO  02/04/24 0101

## 2024-05-09 ENCOUNTER — HOSPITAL ENCOUNTER (EMERGENCY)
Facility: HOSPITAL | Age: 49
Discharge: HOME | End: 2024-05-09
Attending: EMERGENCY MEDICINE
Payer: COMMERCIAL

## 2024-05-09 ENCOUNTER — APPOINTMENT (EMERGENCY)
Dept: RADIOLOGY | Facility: HOSPITAL | Age: 49
End: 2024-05-09
Payer: COMMERCIAL

## 2024-05-09 VITALS
DIASTOLIC BLOOD PRESSURE: 70 MMHG | WEIGHT: 200 LBS | TEMPERATURE: 97.5 F | SYSTOLIC BLOOD PRESSURE: 121 MMHG | HEART RATE: 94 BPM | OXYGEN SATURATION: 96 % | BODY MASS INDEX: 30.41 KG/M2 | RESPIRATION RATE: 20 BRPM

## 2024-05-09 DIAGNOSIS — J40 BRONCHITIS: ICD-10-CM

## 2024-05-09 DIAGNOSIS — R05.9 COUGH: Primary | ICD-10-CM

## 2024-05-09 DIAGNOSIS — R06.2 WHEEZING ON EXPIRATION: ICD-10-CM

## 2024-05-09 DIAGNOSIS — Z71.6 ENCOUNTER FOR SMOKING CESSATION COUNSELING: ICD-10-CM

## 2024-05-09 LAB
FLUAV RNA RESP QL NAA+PROBE: NEGATIVE
FLUBV RNA RESP QL NAA+PROBE: NEGATIVE
RSV RNA RESP QL NAA+PROBE: NEGATIVE
S PYO DNA THROAT QL NAA+PROBE: NOT DETECTED
SARS-COV-2 RNA RESP QL NAA+PROBE: NEGATIVE

## 2024-05-09 PROCEDURE — 87651 STREP A DNA AMP PROBE: CPT | Performed by: EMERGENCY MEDICINE

## 2024-05-09 PROCEDURE — 71046 X-RAY EXAM CHEST 2 VIEWS: CPT

## 2024-05-09 PROCEDURE — 0241U HB NFCT DS VIR RESP RNA 4 TRGT: CPT | Performed by: EMERGENCY MEDICINE

## 2024-05-09 PROCEDURE — 99283 EMERGENCY DEPT VISIT LOW MDM: CPT

## 2024-05-09 PROCEDURE — 99284 EMERGENCY DEPT VISIT MOD MDM: CPT | Performed by: EMERGENCY MEDICINE

## 2024-05-09 RX ORDER — PREDNISONE 20 MG/1
40 TABLET ORAL DAILY
Qty: 8 TABLET | Refills: 0 | Status: SHIPPED | OUTPATIENT
Start: 2024-05-10 | End: 2024-05-14

## 2024-05-09 RX ORDER — ALBUTEROL SULFATE 90 UG/1
2 AEROSOL, METERED RESPIRATORY (INHALATION) ONCE
Status: COMPLETED | OUTPATIENT
Start: 2024-05-09 | End: 2024-05-09

## 2024-05-09 RX ORDER — PREDNISONE 20 MG/1
40 TABLET ORAL ONCE
Status: COMPLETED | OUTPATIENT
Start: 2024-05-09 | End: 2024-05-09

## 2024-05-09 RX ORDER — AZITHROMYCIN 500 MG/1
500 TABLET, FILM COATED ORAL ONCE
Status: COMPLETED | OUTPATIENT
Start: 2024-05-09 | End: 2024-05-09

## 2024-05-09 RX ORDER — AZITHROMYCIN 250 MG/1
TABLET, FILM COATED ORAL
Qty: 4 TABLET | Refills: 0 | Status: SHIPPED | OUTPATIENT
Start: 2024-05-10 | End: 2024-05-13

## 2024-05-09 RX ADMIN — AZITHROMYCIN 500 MG: 500 TABLET, FILM COATED ORAL at 21:48

## 2024-05-09 RX ADMIN — PREDNISONE 40 MG: 20 TABLET ORAL at 21:48

## 2024-05-09 RX ADMIN — ALBUTEROL SULFATE 2 PUFF: 90 AEROSOL, METERED RESPIRATORY (INHALATION) at 21:38

## 2024-05-10 NOTE — ED PROVIDER NOTES
Pt Name: Nic Jean  MRN: [id number]  Birthdate 1975  Age/Sex: 48 y.o. male  Date of evaluation: 5/9/2024  PCP: Tip Gonsalves DO    CHIEF COMPLAINT    Chief Complaint   Patient presents with    Cough     Pt reports cough, sore throat and congestion x 2 weeks. Pt states sometimes the cough is productive. Denies fevers at home.          HPI and MDM    48 y.o. male presenting with he has had symptoms for almost 2 weeks, started with a sore throat, that resolved after a few days, then started having nasal congestion and rhinorrhea and a cough.  Cough is intermittently productive with white sputum.  No chest pain or shortness of breath, no nausea or vomiting.  Does smoke tobacco.  No known sick contacts, no fevers.  No history of asthma or heart disease.      Differential diagnosis considered includes but not limited to pneumonia, viral respiratory infection, new onset COPD, bronchitis.    No oxygen requirement, no respiratory distress, patient however does have some expiratory wheezing appreciated in all lung fields, gave him albuterol, afterwards his wheezing had resolved.  Per my independent interpretation chest x-ray, no acute cardiopulmonary processes.  Swabs are negative.  Patient updated with all results.  Discussed smoking cessation in detail, advised PCP and pulmonology follow-up, may have new onset COPD, concern for bronchitis as well.  Started on antibiotic and given prednisone as well.  Return rico saldaña, he verbalized understanding and is in agreement with plan.            Medications   albuterol (PROVENTIL HFA,VENTOLIN HFA) inhaler 2 puff (2 puffs Inhalation Given 5/9/24 2138)   predniSONE tablet 40 mg (40 mg Oral Given 5/9/24 2148)   azithromycin (ZITHROMAX) tablet 500 mg (500 mg Oral Given 5/9/24 2148)         Past Medical and Surgical History    History reviewed. No pertinent past medical history.    Past Surgical History:   Procedure Laterality Date    BACK SURGERY      HERNIA REPAIR          History reviewed. No pertinent family history.    Social History     Tobacco Use    Smoking status: Every Day     Current packs/day: 1.00     Types: Cigarettes    Smokeless tobacco: Never   Vaping Use    Vaping status: Never Used   Substance Use Topics    Alcohol use: No    Drug use: No           Allergies    Allergies   Allergen Reactions    Bee Venom Anaphylaxis     Reported by pt on 6/27/22       Home Medications    Prior to Admission medications    Medication Sig Start Date End Date Taking? Authorizing Provider   albuterol (PROVENTIL HFA,VENTOLIN HFA) 90 mcg/act inhaler Inhale 2 puffs every 6 (six) hours as needed for wheezing  Patient not taking: Reported on 8/23/2019 12/13/17   Vj Groves MD   cyclobenzaprine (FLEXERIL) 10 mg tablet Take 10 mg by mouth 3 (three) times a day as needed for muscle spasms  Patient not taking: Reported on 8/23/2021    Historical Provider, MD   cyclobenzaprine (FLEXERIL) 10 mg tablet Take 1 tablet (10 mg total) by mouth 2 (two) times a day as needed for muscle spasms  Patient not taking: Reported on 8/23/2021 8/23/19   Woody Hall MD   EPINEPHrine (EPIPEN) 0.3 mg/0.3 mL SOAJ Inject 0.3 mL (0.3 mg total) into a muscle once for 1 dose 6/25/22 6/25/22  Woody Hall MD   famotidine (PEPCID) 20 mg tablet Take 1 tablet (20 mg total) by mouth 2 (two) times a day 6/25/22   Woody Hall MD   naproxen (NAPROSYN) 500 mg tablet Take 1 tablet (500 mg total) by mouth 2 (two) times a day as needed for moderate pain for up to 20 doses  Patient not taking: Reported on 8/23/2021 3/18/21   Don Lama MD   predniSONE 20 mg tablet Take 2 tablets (40 mg total) by mouth daily  Patient not taking: Reported on 8/23/2021 8/23/19   Woody Hall MD   predniSONE 20 mg tablet Take 2 tablets (40 mg total) by mouth daily 6/26/22   Woody Hall MD   valACYclovir (VALTREX) 1,000 mg tablet Take 1 tablet (1,000 mg total) by mouth 3 (three) times a day for 7 days 9/28/20 10/5/20  Woody Hall,  MD           Physical Exam      ED Triage Vitals [05/09/24 2030]   Temperature Pulse Respirations Blood Pressure SpO2   97.5 °F (36.4 °C) (!) 107 18 124/73 95 %      Temp Source Heart Rate Source Patient Position - Orthostatic VS BP Location FiO2 (%)   Temporal Monitor Sitting Left arm --      Pain Score       No Pain               Physical Exam  Constitutional:       General: He is not in acute distress.     Appearance: He is not ill-appearing.   HENT:      Head: Normocephalic and atraumatic.      Nose: Nose normal.      Mouth/Throat:      Mouth: Mucous membranes are moist.   Eyes:      Extraocular Movements: Extraocular movements intact.      Pupils: Pupils are equal, round, and reactive to light.   Cardiovascular:      Rate and Rhythm: Normal rate and regular rhythm.   Pulmonary:      Effort: No respiratory distress.      Comments: Scattered expiratory wheezes in all lung fields  Abdominal:      General: There is no distension.   Musculoskeletal:         General: No swelling or deformity. Normal range of motion.      Cervical back: Normal range of motion.   Skin:     General: Skin is warm.      Findings: No erythema.   Neurological:      Mental Status: He is alert and oriented to person, place, and time. Mental status is at baseline.              Diagnostic Results      Labs:    Results Reviewed       Procedure Component Value Units Date/Time    FLU/RSV/COVID - if FLU/RSV clinically relevant [[id number]]  (Normal) Collected: 05/09/24 2056    Lab Status: Final result Specimen: Nares from Nose Updated: 05/09/24 2141     SARS-CoV-2 Negative     INFLUENZA A PCR Negative     INFLUENZA B PCR Negative     RSV PCR Negative    Narrative:      FOR PEDIATRIC PATIENTS - copy/paste COVID Guidelines URL to browser: [email or web address]    SARS-CoV-2 assay is a Nucleic Acid Amplification assay intended for the  qualitative detection of nucleic acid from SARS-CoV-2 in  nasopharyngeal  swabs. Results are for the presumptive identification of SARS-CoV-2 RNA.    Positive results are indicative of infection with SARS-CoV-2, the virus  causing COVID-19, but do not rule out bacterial infection or co-infection  with other viruses. Laboratories within the [place] and its  territories are required to report all positive results to the appropriate  public health authorities. Negative results do not preclude SARS-CoV-2  infection and should not be used as the sole basis for treatment or other  patient management decisions. Negative results must be combined with  clinical observations, patient history, and epidemiological information.  This test has not been FDA cleared or approved.    This test has been authorized by FDA under an Emergency Use Authorization  (EUA). This test is only authorized for the duration of time the  declaration that circumstances exist justifying the authorization of the  emergency use of an in vitro diagnostic tests for detection of SARS-CoV-2  virus and/or diagnosis of COVID-19 infection under section 564(b)(1) of  the Act, 21 U.S.C. 360bbb-3(b)(1), unless the authorization is terminated  or revoked sooner. The test has been validated but independent review by FDA  and CLIA is pending.    Test performed using [employer] GeneXpert: This RT-PCR assay targets N2,  a region unique to SARS-CoV-2. A conserved region in the E-gene was chosen  for pan-Sarbecovirus detection which includes SARS-CoV-2.    According to CMS-2020-01-R, this platform meets the definition of high-throughput technology.    Strep A PCR [[id number]]  (Normal) Collected: 05/09/24 2056    Lab Status: Final result Specimen: Throat Updated: 05/09/24 2127     STREP A PCR Not Detected            All labs reviewed and utilized in the medical decision making process    Radiology:    XR chest 2 views    (Results Pending)       All radiology studies independently viewed by me and interpreted by the  radiologist.    Procedure    Procedures        FINAL IMPRESSION    Final diagnoses:   Cough   Wheezing on expiration   Bronchitis   Encounter for smoking cessation counseling         DISPOSITION    Time reflects when diagnosis was documented in both MDM as applicable and the Disposition within this note       Time User Action Codes Description Comment    5/9/2024  9:44 PM Steven, Bilal Add [R05.9] Cough     5/9/2024  9:44 PM Steven, Bilal Add [R06.2] Wheezing on expiration     5/9/2024  9:45 PM Steven, Bilal Add [J40] Bronchitis     5/9/2024  9:59 PM Steven, Bilal Add [Z71.6] Encounter for smoking cessation counseling           ED Disposition       ED Disposition   Discharge    Condition   Stable    Date/Time   Thu May 9, 2024  9:44 PM    Comment   Nic Jean discharge to home/self care.                   Follow-up Information       Follow up With Specialties Details Why Contact Info Additional Information    [facility] Call in 1 day  [address] [address] [phone] [facility], [address], [place], [place], [address] [phone]              PATIENT REFERRED TO:    [facility]  [address] [address] [phone]  Call in 1 day        DISCHARGE MEDICATIONS:    Discharge Medication List as of 5/9/2024 10:04 PM        START taking these medications    Details   azithromycin (ZITHROMAX) 250 mg tablet Take 1 tablet daily x 4 days Do not start before May 10, 2024., Normal           CONTINUE these medications which have CHANGED    Details   predniSONE 20 mg tablet Take 2 tablets (40 mg total) by mouth daily for 4 days Do not start before May 10, 2024., Starting Fri 5/10/2024, Until Tue 5/14/2024, Normal           CONTINUE these medications which have NOT CHANGED    Details   albuterol (PROVENTIL  HFA,VENTOLIN HFA) 90 mcg/act inhaler Inhale 2 puffs every 6 (six) hours as needed for wheezing, Starting Wed 12/13/2017, Print      !! cyclobenzaprine (FLEXERIL) 10 mg tablet Take 10 mg by mouth 3 (three) times a day as needed for muscle spasms, Historical Med      !! cyclobenzaprine (FLEXERIL) 10 mg tablet Take 1 tablet (10 mg total) by mouth 2 (two) times a day as needed for muscle spasms, Starting Fri 8/23/2019, Print      EPINEPHrine (EPIPEN) 0.3 mg/0.3 mL SOAJ Inject 0.3 mL (0.3 mg total) into a muscle once for 1 dose, Starting Sat 6/25/2022, Print      famotidine (PEPCID) 20 mg tablet Take 1 tablet (20 mg total) by mouth 2 (two) times a day, Starting Sat 6/25/2022, Print      naproxen (NAPROSYN) 500 mg tablet Take 1 tablet (500 mg total) by mouth 2 (two) times a day as needed for moderate pain for up to 20 doses, Starting Thu 3/18/2021, Print      valACYclovir (VALTREX) 1,000 mg tablet Take 1 tablet (1,000 mg total) by mouth 3 (three) times a day for 7 days, Starting Mon 9/28/2020, Until Mon 10/5/2020, Print       !! - Potential duplicate medications found. Please discuss with provider.          No discharge procedures on file.         Vaughn Harris DO        This note was partially completed using voice recognition technology, and was scanned for gross errors; however some errors may still exist. Please contact the author with any questions or requests for clarification.      Vaughn Harris DO  05/09/24 1593

## 2024-09-29 ENCOUNTER — APPOINTMENT (EMERGENCY)
Dept: RADIOLOGY | Facility: HOSPITAL | Age: 49
End: 2024-09-29
Payer: COMMERCIAL

## 2024-09-29 ENCOUNTER — HOSPITAL ENCOUNTER (EMERGENCY)
Facility: HOSPITAL | Age: 49
Discharge: HOME | End: 2024-09-29
Attending: EMERGENCY MEDICINE | Admitting: EMERGENCY MEDICINE
Payer: COMMERCIAL

## 2024-09-29 VITALS
BODY MASS INDEX: 31.81 KG/M2 | TEMPERATURE: 97.6 F | SYSTOLIC BLOOD PRESSURE: 134 MMHG | RESPIRATION RATE: 18 BRPM | HEIGHT: 68 IN | WEIGHT: 209.88 LBS | HEART RATE: 88 BPM | DIASTOLIC BLOOD PRESSURE: 70 MMHG | OXYGEN SATURATION: 98 %

## 2024-09-29 DIAGNOSIS — M54.9 BACK PAIN: Primary | ICD-10-CM

## 2024-09-29 PROCEDURE — 96372 THER/PROPH/DIAG INJ SC/IM: CPT

## 2024-09-29 PROCEDURE — 72100 X-RAY EXAM L-S SPINE 2/3 VWS: CPT

## 2024-09-29 PROCEDURE — 99283 EMERGENCY DEPT VISIT LOW MDM: CPT

## 2024-09-29 PROCEDURE — 99284 EMERGENCY DEPT VISIT MOD MDM: CPT | Performed by: EMERGENCY MEDICINE

## 2024-09-29 RX ORDER — KETOROLAC TROMETHAMINE 30 MG/ML
15 INJECTION, SOLUTION INTRAMUSCULAR; INTRAVENOUS ONCE
Status: COMPLETED | OUTPATIENT
Start: 2024-09-29 | End: 2024-09-29

## 2024-09-29 RX ORDER — METHOCARBAMOL 500 MG/1
500 TABLET, FILM COATED ORAL 2 TIMES DAILY
Qty: 20 TABLET | Refills: 0 | Status: SHIPPED | OUTPATIENT
Start: 2024-09-29

## 2024-09-29 RX ORDER — METHOCARBAMOL 500 MG/1
500 TABLET, FILM COATED ORAL ONCE
Status: COMPLETED | OUTPATIENT
Start: 2024-09-29 | End: 2024-09-29

## 2024-09-29 RX ORDER — IBUPROFEN 800 MG/1
800 TABLET, FILM COATED ORAL 3 TIMES DAILY
Qty: 21 TABLET | Refills: 0 | Status: SHIPPED | OUTPATIENT
Start: 2024-09-29

## 2024-09-29 RX ADMIN — KETOROLAC TROMETHAMINE 15 MG: 30 INJECTION, SOLUTION INTRAMUSCULAR at 20:48

## 2024-09-29 RX ADMIN — METHOCARBAMOL TABLETS 500 MG: 500 TABLET, COATED ORAL at 20:48

## 2024-09-29 NOTE — Clinical Note
Nic Jean was seen and treated in our emergency department on 9/29/2024.                Diagnosis:     Nic  .    He may return on this date: 10/02/2024         If you have any questions or concerns, please don't hesitate to call.      Lane Castro MD    ______________________________           _______________          _______________  Hospital Representative                              Date                                Time

## 2024-09-30 NOTE — ED PROVIDER NOTES
Final diagnoses:   Back pain     ED Disposition       ED Disposition   Discharge    Condition   Stable    Date/Time   Sun Sep 29, 2024  9:50 PM    Comment   Nic Jean discharge to home/self care.                   Assessment & Plan       Medical Decision Making  48-year-old male presenting to the emergency department for evaluation of back pain.  Patient has exacerbation of chronic symptoms, no red flags here will check x-ray give NSAIDs Robaxin and discharged for also, spine.    Amount and/or Complexity of Data Reviewed  Radiology: ordered.    Risk  Prescription drug management.             Medications   ketorolac (TORADOL) injection 15 mg (15 mg Intramuscular Given 9/29/24 2048)   methocarbamol (ROBAXIN) tablet 500 mg (500 mg Oral Given 9/29/24 2048)       ED Risk Strat Scores                           SBIRT 20yo+      Flowsheet Row Most Recent Value   Initial Alcohol Screen: US AUDIT-C     1. How often do you have a drink containing alcohol? 0 Filed at: 09/29/2024 1951   2. How many drinks containing alcohol do you have on a typical day you are drinking?  0 Filed at: 09/29/2024 1951   3a. Male UNDER 65: How often do you have five or more drinks on one occasion? 0 Filed at: 09/29/2024 1951   3b. FEMALE Any Age, or MALE 65+: How often do you have 4 or more drinks on one occassion? 0 Filed at: 09/29/2024 1951   Audit-C Score 0 Filed at: 09/29/2024 1951   ELEAZAR: How many times in the past year have you...    Used an illegal drug or used a prescription medication for non-medical reasons? Never Filed at: 09/29/2024 1951                            History of Present Illness       Chief Complaint   Patient presents with    Back Pain     Lower back pain starting yesterday, reports hx of surgery in 1994 and occ has issues. Took aleeve at 1300 w/o relief         History reviewed. No pertinent past medical history.   Past Surgical History:   Procedure Laterality Date    BACK SURGERY      HERNIA REPAIR        History  reviewed. No pertinent family history.   Social History     Tobacco Use    Smoking status: Every Day     Current packs/day: 1.00     Types: Cigarettes    Smokeless tobacco: Never   Vaping Use    Vaping status: Never Used   Substance Use Topics    Alcohol use: No    Drug use: No      E-Cigarette/Vaping    E-Cigarette Use Never User       E-Cigarette/Vaping Substances      I have reviewed and agree with the history as documented.     48-year-old male presenting to the emergency department for evaluation of back pain.  Patient had intermittent chronic back pain since surgery in 1995.  Tonight he states that his symptoms have been exacerbated for the past 3 to 4 days aching lumbar pain with some radiation intermittently down the legs not associated with any numbness weakness tingling associated with any bowel or bladder incontinence saddle anesthesia or fevers.        Review of Systems   Constitutional:  Negative for appetite change, chills, fatigue and fever.   HENT:  Negative for sneezing and sore throat.    Eyes:  Negative for visual disturbance.   Respiratory:  Negative for cough, choking, chest tightness, shortness of breath and wheezing.    Cardiovascular:  Negative for chest pain and palpitations.   Gastrointestinal:  Negative for abdominal pain, constipation, diarrhea, nausea and vomiting.   Genitourinary:  Negative for difficulty urinating and dysuria.   Musculoskeletal:  Positive for back pain.   Neurological:  Negative for dizziness, weakness, light-headedness, numbness and headaches.   All other systems reviewed and are negative.          Objective       ED Triage Vitals   Temperature Pulse Blood Pressure Respirations SpO2 Patient Position - Orthostatic VS   09/29/24 1949 09/29/24 1949 09/29/24 1949 09/29/24 1949 09/29/24 1949 09/29/24 1949   97.6 °F (36.4 °C) 88 134/70 18 98 % Sitting      Temp Source Heart Rate Source BP Location FiO2 (%) Pain Score    09/29/24 1949 09/29/24 1949 09/29/24 1949 -- 09/29/24  1951    Temporal Monitor Left arm  7      Vitals      Date and Time Temp Pulse SpO2 Resp BP Pain Score FACES Pain Rating User   09/29/24 1951 -- -- -- -- -- 7 -- VB   09/29/24 1949 97.6 °F (36.4 °C) 88 98 % 18 134/70 -- -- BS            Physical Exam  Vitals and nursing note reviewed.   Constitutional:       General: He is not in acute distress.     Appearance: He is well-developed. He is not diaphoretic.   HENT:      Head: Normocephalic and atraumatic.   Eyes:      Pupils: Pupils are equal, round, and reactive to light.   Neck:      Vascular: No JVD.      Trachea: No tracheal deviation.   Cardiovascular:      Rate and Rhythm: Normal rate and regular rhythm.      Heart sounds: Normal heart sounds. No murmur heard.     No friction rub. No gallop.   Pulmonary:      Effort: Pulmonary effort is normal. No respiratory distress.      Breath sounds: Normal breath sounds. No wheezing or rales.   Abdominal:      General: Bowel sounds are normal. There is no distension.      Palpations: Abdomen is soft.      Tenderness: There is no abdominal tenderness. There is no guarding or rebound.   Skin:     General: Skin is warm and dry.      Coloration: Skin is not pale.   Neurological:      Mental Status: He is alert and oriented to person, place, and time.      Cranial Nerves: No cranial nerve deficit.      Motor: No abnormal muscle tone.   Psychiatric:         Behavior: Behavior normal.         Results Reviewed       None            XR spine lumbar 2 or 3 views injury    (Results Pending)       Procedures    ED Medication and Procedure Management   Prior to Admission Medications   Prescriptions Last Dose Informant Patient Reported? Taking?   EPINEPHrine (EPIPEN) 0.3 mg/0.3 mL SOAJ   No No   Sig: Inject 0.3 mL (0.3 mg total) into a muscle once for 1 dose   albuterol (PROVENTIL HFA,VENTOLIN HFA) 90 mcg/act inhaler   No No   Sig: Inhale 2 puffs every 6 (six) hours as needed for wheezing   Patient not taking: Reported on 8/23/2019    cyclobenzaprine (FLEXERIL) 10 mg tablet   Yes No   Sig: Take 10 mg by mouth 3 (three) times a day as needed for muscle spasms   Patient not taking: Reported on 8/23/2021   cyclobenzaprine (FLEXERIL) 10 mg tablet   No No   Sig: Take 1 tablet (10 mg total) by mouth 2 (two) times a day as needed for muscle spasms   Patient not taking: Reported on 8/23/2021   famotidine (PEPCID) 20 mg tablet   No No   Sig: Take 1 tablet (20 mg total) by mouth 2 (two) times a day   naproxen (NAPROSYN) 500 mg tablet   No No   Sig: Take 1 tablet (500 mg total) by mouth 2 (two) times a day as needed for moderate pain for up to 20 doses   Patient not taking: Reported on 8/23/2021   valACYclovir (VALTREX) 1,000 mg tablet   No No   Sig: Take 1 tablet (1,000 mg total) by mouth 3 (three) times a day for 7 days      Facility-Administered Medications: None     Discharge Medication List as of 9/29/2024  9:51 PM        START taking these medications    Details   ibuprofen (MOTRIN) 800 mg tablet Take 1 tablet (800 mg total) by mouth 3 (three) times a day, Starting Sun 9/29/2024, Normal      methocarbamol (ROBAXIN) 500 mg tablet Take 1 tablet (500 mg total) by mouth 2 (two) times a day, Starting Sun 9/29/2024, Normal           CONTINUE these medications which have NOT CHANGED    Details   albuterol (PROVENTIL HFA,VENTOLIN HFA) 90 mcg/act inhaler Inhale 2 puffs every 6 (six) hours as needed for wheezing, Starting Wed 12/13/2017, Print      !! cyclobenzaprine (FLEXERIL) 10 mg tablet Take 10 mg by mouth 3 (three) times a day as needed for muscle spasms, Historical Med      !! cyclobenzaprine (FLEXERIL) 10 mg tablet Take 1 tablet (10 mg total) by mouth 2 (two) times a day as needed for muscle spasms, Starting Fri 8/23/2019, Print      EPINEPHrine (EPIPEN) 0.3 mg/0.3 mL SOAJ Inject 0.3 mL (0.3 mg total) into a muscle once for 1 dose, Starting Sat 6/25/2022, Print      famotidine (PEPCID) 20 mg tablet Take 1 tablet (20 mg total) by mouth 2 (two) times a  day, Starting Sat 6/25/2022, Print      naproxen (NAPROSYN) 500 mg tablet Take 1 tablet (500 mg total) by mouth 2 (two) times a day as needed for moderate pain for up to 20 doses, Starting Thu 3/18/2021, Print      valACYclovir (VALTREX) 1,000 mg tablet Take 1 tablet (1,000 mg total) by mouth 3 (three) times a day for 7 days, Starting Mon 9/28/2020, Until Mon 10/5/2020, Print       !! - Potential duplicate medications found. Please discuss with provider.          ED SEPSIS DOCUMENTATION   Time reflects when diagnosis was documented in both MDM as applicable and the Disposition within this note       Time User Action Codes Description Comment    9/29/2024  9:50 PM Lane Castro Add [M54.9] Back pain                  Lane Castro MD  09/30/24 0533

## 2024-10-01 ENCOUNTER — TELEPHONE (OUTPATIENT)
Dept: PHYSICAL THERAPY | Facility: OTHER | Age: 49
End: 2024-10-01

## 2024-10-01 NOTE — TELEPHONE ENCOUNTER
Call placed to the patient per [facility] referral.    V/M left for patient to call back. Phone number and hours of business provided.    This is the 1st attempt to reach the patient. Will defer referral per protocol.    Hx of back sx in 1994 or 1995??

## 2024-10-03 NOTE — TELEPHONE ENCOUNTER
Call placed to the patient per [facility] referral.     V/M left for patient to call back. Phone number and hours of business provided.     This is the 2nd attempt to reach the patient. Will defer referral per protocol.     Hx of back sx in 1994 or 1995??

## 2024-10-10 NOTE — TELEPHONE ENCOUNTER
Call placed to the patient per [facility] referral.    Voice message left for patient to call back. Phone number and hours of business provided.     This is the 3rd attempt to reach the patient.   Referral closed.

## 2024-11-25 ENCOUNTER — HOSPITAL ENCOUNTER (EMERGENCY)
Facility: HOSPITAL | Age: 49
Discharge: HOME | End: 2024-11-25
Attending: EMERGENCY MEDICINE
Payer: COMMERCIAL

## 2024-11-25 ENCOUNTER — APPOINTMENT (EMERGENCY)
Dept: CT IMAGING | Facility: HOSPITAL | Age: 49
End: 2024-11-25
Payer: COMMERCIAL

## 2024-11-25 VITALS
TEMPERATURE: 98.6 F | DIASTOLIC BLOOD PRESSURE: 78 MMHG | HEART RATE: 99 BPM | BODY MASS INDEX: 31.93 KG/M2 | OXYGEN SATURATION: 98 % | SYSTOLIC BLOOD PRESSURE: 135 MMHG | RESPIRATION RATE: 18 BRPM | WEIGHT: 210 LBS

## 2024-11-25 DIAGNOSIS — M25.552 LEFT HIP PAIN: Primary | ICD-10-CM

## 2024-11-25 LAB
ALBUMIN SERPL BCG-MCNC: 3.9 G/DL (ref 3.5–5)
ALP SERPL-CCNC: 76 U/L (ref 34–104)
ALT SERPL W P-5'-P-CCNC: 15 U/L (ref 7–52)
ANION GAP SERPL CALCULATED.3IONS-SCNC: 7 MMOL/L (ref 4–13)
AST SERPL W P-5'-P-CCNC: 22 U/L (ref 13–39)
BASOPHILS # BLD AUTO: 0.05 THOUSANDS/ΜL (ref 0–0.1)
BASOPHILS NFR BLD AUTO: 1 % (ref 0–1)
BILIRUB SERPL-MCNC: 0.51 MG/DL (ref 0.2–1)
BUN SERPL-MCNC: 13 MG/DL (ref 5–25)
CALCIUM SERPL-MCNC: 8.9 MG/DL (ref 8.4–10.2)
CHLORIDE SERPL-SCNC: 106 MMOL/L (ref 96–108)
CO2 SERPL-SCNC: 25 MMOL/L (ref 21–32)
CREAT SERPL-MCNC: 0.87 MG/DL (ref 0.6–1.3)
EOSINOPHIL # BLD AUTO: 0.2 THOUSAND/ΜL (ref 0–0.61)
EOSINOPHIL NFR BLD AUTO: 2 % (ref 0–6)
ERYTHROCYTE [DISTWIDTH] IN BLOOD BY AUTOMATED COUNT: 12.9 % (ref 11.6–15.1)
GFR SERPL CREATININE-BSD FRML MDRD: 100 ML/MIN/1.73SQ M
GLUCOSE SERPL-MCNC: 125 MG/DL (ref 65–140)
HCT VFR BLD AUTO: 43 % (ref 36.5–49.3)
HGB BLD-MCNC: 14.3 G/DL (ref 12–17)
IMM GRANULOCYTES # BLD AUTO: 0.03 THOUSAND/UL (ref 0–0.2)
IMM GRANULOCYTES NFR BLD AUTO: 0 % (ref 0–2)
LIPASE SERPL-CCNC: 19 U/L (ref 11–82)
LYMPHOCYTES # BLD AUTO: 2.19 THOUSANDS/ΜL (ref 0.6–4.47)
LYMPHOCYTES NFR BLD AUTO: 23 % (ref 14–44)
MCH RBC QN AUTO: 31.7 PG (ref 26.8–34.3)
MCHC RBC AUTO-ENTMCNC: 33.3 G/DL (ref 31.4–37.4)
MCV RBC AUTO: 95 FL (ref 82–98)
MONOCYTES # BLD AUTO: 0.66 THOUSAND/ΜL (ref 0.17–1.22)
MONOCYTES NFR BLD AUTO: 7 % (ref 4–12)
NEUTROPHILS # BLD AUTO: 6.49 THOUSANDS/ΜL (ref 1.85–7.62)
NEUTS SEG NFR BLD AUTO: 67 % (ref 43–75)
NRBC BLD AUTO-RTO: 0 /100 WBCS
PLATELET # BLD AUTO: 209 THOUSANDS/UL (ref 149–390)
PMV BLD AUTO: 10 FL (ref 8.9–12.7)
POTASSIUM SERPL-SCNC: 3.6 MMOL/L (ref 3.5–5.3)
PROT SERPL-MCNC: 6.4 G/DL (ref 6.4–8.4)
RBC # BLD AUTO: 4.51 MILLION/UL (ref 3.88–5.62)
SODIUM SERPL-SCNC: 138 MMOL/L (ref 135–147)
WBC # BLD AUTO: 9.62 THOUSAND/UL (ref 4.31–10.16)

## 2024-11-25 PROCEDURE — 74177 CT ABD & PELVIS W/CONTRAST: CPT

## 2024-11-25 PROCEDURE — 99284 EMERGENCY DEPT VISIT MOD MDM: CPT | Performed by: EMERGENCY MEDICINE

## 2024-11-25 PROCEDURE — 99284 EMERGENCY DEPT VISIT MOD MDM: CPT

## 2024-11-25 PROCEDURE — 80053 COMPREHEN METABOLIC PANEL: CPT | Performed by: EMERGENCY MEDICINE

## 2024-11-25 PROCEDURE — 36415 COLL VENOUS BLD VENIPUNCTURE: CPT | Performed by: EMERGENCY MEDICINE

## 2024-11-25 PROCEDURE — 83690 ASSAY OF LIPASE: CPT | Performed by: EMERGENCY MEDICINE

## 2024-11-25 PROCEDURE — 96374 THER/PROPH/DIAG INJ IV PUSH: CPT

## 2024-11-25 PROCEDURE — 85025 COMPLETE CBC W/AUTO DIFF WBC: CPT | Performed by: EMERGENCY MEDICINE

## 2024-11-25 RX ORDER — KETOROLAC TROMETHAMINE 30 MG/ML
15 INJECTION, SOLUTION INTRAMUSCULAR; INTRAVENOUS ONCE
Status: COMPLETED | OUTPATIENT
Start: 2024-11-25 | End: 2024-11-25

## 2024-11-25 RX ADMIN — KETOROLAC TROMETHAMINE 15 MG: 30 INJECTION, SOLUTION INTRAMUSCULAR at 19:49

## 2024-11-25 RX ADMIN — IOHEXOL 100 ML: 350 INJECTION, SOLUTION INTRAVENOUS at 20:36

## 2024-11-25 NOTE — Clinical Note
Nic Jean was seen and treated in our emergency department on 11/25/2024.                Diagnosis: hip pain    Nic  may return to work on return date.    He may return on this date: 11/29/2024         If you have any questions or concerns, please don't hesitate to call.      Deonte Lares MD    ______________________________           _______________          _______________  Hospital Representative                              Date                                Time

## 2024-11-26 NOTE — ED PROVIDER NOTES
Time reflects when diagnosis was documented in both MDM as applicable and the Disposition within this note       Time User Action Codes Description Comment    11/25/2024  9:13 PM Johan Laresn Add [M25.552] Left hip pain           ED Disposition       ED Disposition   Discharge    Condition   Stable    Date/Time   Mon Nov 25, 2024  9:13 PM    Comment   Nic Montanoleixs discharge to home/self care.                   Assessment & Plan       Medical Decision Making  48-year-old male no significant reported past history presenting with left hip pain.  Patient regularly wears 2 develops, pain possibly related.  No tenderness or significant worsening with movement.  Patient reports feels similar to previous episode of epiploic appendagitis but nowhere near as painful.  Shared decision making with patient and will get labs and CT imaging.  Symptom management with IV pain medication.  Reassess.    Labs interpreted by me without significant acute process.  Symptoms improving with medications.  CT imaging no significant acute process. Discussed results and recommendations. Advised follow up PCP. Medication recommendations. Given instructions and return precautions. Patient/family at bedside acknowledged understanding of all written and verbal instructions and return precautions. Discharged.     Amount and/or Complexity of Data Reviewed  Labs: ordered.  Radiology: ordered.    Risk  Prescription drug management.             Medications   ketorolac (TORADOL) injection 15 mg (15 mg Intravenous Given 11/25/24 1949)   iohexol (OMNIPAQUE) 350 MG/ML injection (MULTI-DOSE) 100 mL (100 mL Intravenous Given 11/25/24 2036)       ED Risk Strat Scores                           SBIRT 22yo+      Flowsheet Row Most Recent Value   Initial Alcohol Screen: US AUDIT-C     1. How often do you have a drink containing alcohol? 0 Filed at: 11/25/2024 1950   2. How many drinks containing alcohol do you have on a typical day you are drinking?  0 Filed at:  11/25/2024 1950   3a. Male UNDER 65: How often do you have five or more drinks on one occasion? 0 Filed at: 11/25/2024 1950   Audit-C Score 0 Filed at: 11/25/2024 1950   ELEAZAR: How many times in the past year have you...    Used an illegal drug or used a prescription medication for non-medical reasons? Never Filed at: 11/25/2024 1950                            History of Present Illness       Chief Complaint   Patient presents with    Hip Pain     L hip pain started yesterday , non radiating    Back Pain     Low back pain, hx of back  surgery        No past medical history on file.   Past Surgical History:   Procedure Laterality Date    BACK SURGERY      HERNIA REPAIR        No family history on file.   Social History     Tobacco Use    Smoking status: Every Day     Current packs/day: 1.00     Types: Cigarettes    Smokeless tobacco: Never   Vaping Use    Vaping status: Never Used   Substance Use Topics    Alcohol use: No    Drug use: No      E-Cigarette/Vaping    E-Cigarette Use Never User       E-Cigarette/Vaping Substances      I have reviewed and agree with the history as documented.     48-year-old male no significant reported past history presenting with left hip pain.  Reports symptoms began 3 days ago insidiously.  Denies any known trauma or injury.  Reports pain primarily crest of left pelvis.  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  Denies any tenderness or increased pain with movement.  Denies any abdominal pain nausea vomiting diarrhea.  Denies any chest pain shortness of breath.  Denies any other complaints.  Chart reviewed.    No past medical history on file.  Family History: non-contributory  Social History          Review of Systems   Constitutional:  Negative for appetite change, chills, diaphoresis, fever and unexpected weight change.   HENT:  Negative for congestion and rhinorrhea.    Eyes:  Negative for photophobia and visual disturbance.    Respiratory:  Negative for cough, chest tightness and shortness of breath.    Cardiovascular:  Negative for chest pain, palpitations and leg swelling.   Gastrointestinal:  Negative for abdominal distention, abdominal pain, blood in stool, constipation, diarrhea, nausea and vomiting.   Genitourinary:  Negative for dysuria and hematuria.   Musculoskeletal:  Positive for arthralgias. Negative for back pain, joint swelling, neck pain and neck stiffness.   Skin:  Negative for color change, pallor, rash and wound.   Neurological:  Negative for dizziness, syncope, weakness, light-headedness and headaches.   Psychiatric/Behavioral:  Negative for agitation.    All other systems reviewed and are negative.          Objective       ED Triage Vitals [11/25/24 1913]   Temperature Pulse Blood Pressure Respirations SpO2 Patient Position - Orthostatic VS   98.6 °F (37 °C) 99 135/78 18 98 % --      Temp Source Heart Rate Source BP Location FiO2 (%) Pain Score    Oral -- -- -- --      Vitals      Date and Time Temp Pulse SpO2 Resp BP Pain Score FACES Pain Rating User   11/25/24 1913 98.6 °F (37 °C) 99 98 % 18 135/78 -- -- AM            Physical Exam  Vitals and nursing note reviewed.   Constitutional:       General: He is not in acute distress.     Appearance: Normal appearance. He is well-developed. He is not ill-appearing, toxic-appearing or diaphoretic.   HENT:      Head: Normocephalic and atraumatic.      Nose: Nose normal. No congestion or rhinorrhea.      Mouth/Throat:      Mouth: Mucous membranes are moist.      Pharynx: Oropharynx is clear. No oropharyngeal exudate or posterior oropharyngeal erythema.   Eyes:      General: No scleral icterus.        Right eye: No discharge.         Left eye: No discharge.      Extraocular Movements: Extraocular movements intact.      Conjunctiva/sclera: Conjunctivae normal.      Pupils: Pupils are equal, round, and reactive to light.   Neck:      Vascular: No JVD.      Trachea: No tracheal  deviation.      Comments: Supple. Normal range of motion.   Cardiovascular:      Rate and Rhythm: Normal rate and regular rhythm.      Heart sounds: Normal heart sounds. No murmur heard.     No friction rub. No gallop.      Comments: Normal rate and regular rhythm  Pulmonary:      Effort: Pulmonary effort is normal. No respiratory distress.      Breath sounds: Normal breath sounds. No stridor. No wheezing or rales.      Comments: Clear to auscultation bilaterally  Chest:      Chest wall: No tenderness.   Abdominal:      General: Bowel sounds are normal. There is no distension.      Palpations: Abdomen is soft.      Tenderness: There is no abdominal tenderness. There is no right CVA tenderness, left CVA tenderness, guarding or rebound.      Comments: Soft, nontender, nondistended.  Normal bowel sounds throughout   Musculoskeletal:         General: No swelling, tenderness, deformity or signs of injury. Normal range of motion.      Cervical back: Normal range of motion and neck supple. No rigidity. No muscular tenderness.      Right lower leg: No edema.      Left lower leg: No edema.      Comments: No back tenderness including midline.  No hip or pelvis tenderness   Lymphadenopathy:      Cervical: No cervical adenopathy.   Skin:     General: Skin is warm and dry.      Coloration: Skin is not pale.      Findings: No erythema or rash.   Neurological:      General: No focal deficit present.      Mental Status: He is alert. Mental status is at baseline.      Sensory: No sensory deficit.      Motor: No weakness or abnormal muscle tone.      Coordination: Coordination normal.      Gait: Gait normal.      Comments: Alert.  Strength and sensation grossly intact.  Ambulatory without difficulty at baseline.    Psychiatric:         Behavior: Behavior normal.         Thought Content: Thought content normal.         Results Reviewed       Procedure Component Value Units Date/Time    Comprehensive metabolic panel [[id number]]  Collected: 11/25/24 1948    Lab Status: Final result Specimen: Blood from Arm, Right Updated: 11/25/24 2019     Sodium 138 mmol/L      Potassium 3.6 mmol/L      Chloride 106 mmol/L      CO2 25 mmol/L      ANION GAP 7 mmol/L      BUN 13 mg/dL      Creatinine 0.87 mg/dL      Glucose 125 mg/dL      Calcium 8.9 mg/dL      AST 22 U/L      ALT 15 U/L      Alkaline Phosphatase 76 U/L      Total Protein 6.4 g/dL      Albumin 3.9 g/dL      Total Bilirubin 0.51 mg/dL      eGFR 100 ml/min/1.73sq m     Narrative:      [employer] guidelines for Chronic Kidney Disease (CKD):     Stage 1 with normal or high GFR (GFR > 90 mL/min/1.73 square meters)    Stage 2 Mild CKD (GFR = 60-89 mL/min/1.73 square meters)    Stage 3A Moderate CKD (GFR = 45-59 mL/min/1.73 square meters)    Stage 3B Moderate CKD (GFR = 30-44 mL/min/1.73 square meters)    Stage 4 Severe CKD (GFR = 15-29 mL/min/1.73 square meters)    Stage 5 End Stage CKD (GFR <15 mL/min/1.73 square meters)  Note: GFR calculation is accurate only with a steady state creatinine    Lipase [[id number]]  (Normal) Collected: 11/25/24 1948    Lab Status: Final result Specimen: Blood from Arm, Right Updated: 11/25/24 2019     Lipase 19 u/L     CBC and differential [[id number]] Collected: 11/25/24 1948    Lab Status: Final result Specimen: Blood from Arm, Right Updated: 11/25/24 2001     WBC 9.62 Thousand/uL      RBC 4.51 Million/uL      Hemoglobin 14.3 g/dL      Hematocrit 43.0 %      MCV 95 fL      MCH 31.7 pg      MCHC 33.3 g/dL      RDW 12.9 %      MPV 10.0 fL      Platelets 209 Thousands/uL      nRBC 0 /100 WBCs      Segmented % 67 %      Immature Grans % 0 %      Lymphocytes % 23 %      Monocytes % 7 %      Eosinophils Relative 2 %      Basophils Relative 1 %      Absolute Neutrophils 6.49 Thousands/µL      Absolute Immature Grans 0.03 Thousand/uL      Absolute Lymphocytes 2.19 Thousands/µL      Absolute Monocytes 0.66 Thousand/µL      Eosinophils Absolute 0.20  Thousand/µL      Basophils Absolute 0.05 Thousands/µL             CT abdomen pelvis with contrast   Final Interpretation by Sindhu Velasquez MD (11/25 2108)      No acute findings in the abdomen or pelvis.      Colonic diverticulosis without acute diverticulitis.         Workstation performed: [id number]             Procedures    ED Medication and Procedure Management   Prior to Admission Medications   Prescriptions Last Dose Informant Patient Reported? Taking?   EPINEPHrine (EPIPEN) 0.3 mg/0.3 mL SOAJ   No No   Sig: Inject 0.3 mL (0.3 mg total) into a muscle once for 1 dose   albuterol (PROVENTIL HFA,VENTOLIN HFA) 90 mcg/act inhaler   No No   Sig: Inhale 2 puffs every 6 (six) hours as needed for wheezing   Patient not taking: Reported on 8/23/2019   cyclobenzaprine (FLEXERIL) 10 mg tablet   Yes No   Sig: Take 10 mg by mouth 3 (three) times a day as needed for muscle spasms   Patient not taking: Reported on 8/23/2021   cyclobenzaprine (FLEXERIL) 10 mg tablet   No No   Sig: Take 1 tablet (10 mg total) by mouth 2 (two) times a day as needed for muscle spasms   Patient not taking: Reported on 8/23/2021   famotidine (PEPCID) 20 mg tablet   No No   Sig: Take 1 tablet (20 mg total) by mouth 2 (two) times a day   ibuprofen (MOTRIN) 800 mg tablet   No No   Sig: Take 1 tablet (800 mg total) by mouth 3 (three) times a day   methocarbamol (ROBAXIN) 500 mg tablet   No No   Sig: Take 1 tablet (500 mg total) by mouth 2 (two) times a day   naproxen (NAPROSYN) 500 mg tablet   No No   Sig: Take 1 tablet (500 mg total) by mouth 2 (two) times a day as needed for moderate pain for up to 20 doses   Patient not taking: Reported on 8/23/2021   valACYclovir (VALTREX) 1,000 mg tablet   No No   Sig: Take 1 tablet (1,000 mg total) by mouth 3 (three) times a day for 7 days      Facility-Administered Medications: None     Discharge Medication List as of 11/25/2024  9:13 PM        CONTINUE these medications which have NOT CHANGED    Details    albuterol (PROVENTIL HFA,VENTOLIN HFA) 90 mcg/act inhaler Inhale 2 puffs every 6 (six) hours as needed for wheezing, Starting Wed 12/13/2017, Print      !! cyclobenzaprine (FLEXERIL) 10 mg tablet Take 10 mg by mouth 3 (three) times a day as needed for muscle spasms, Historical Med      !! cyclobenzaprine (FLEXERIL) 10 mg tablet Take 1 tablet (10 mg total) by mouth 2 (two) times a day as needed for muscle spasms, Starting Fri 8/23/2019, Print      EPINEPHrine (EPIPEN) 0.3 mg/0.3 mL SOAJ Inject 0.3 mL (0.3 mg total) into a muscle once for 1 dose, Starting Sat 6/25/2022, Print      famotidine (PEPCID) 20 mg tablet Take 1 tablet (20 mg total) by mouth 2 (two) times a day, Starting Sat 6/25/2022, Print      ibuprofen (MOTRIN) 800 mg tablet Take 1 tablet (800 mg total) by mouth 3 (three) times a day, Starting Sun 9/29/2024, Normal      methocarbamol (ROBAXIN) 500 mg tablet Take 1 tablet (500 mg total) by mouth 2 (two) times a day, Starting Sun 9/29/2024, Normal      naproxen (NAPROSYN) 500 mg tablet Take 1 tablet (500 mg total) by mouth 2 (two) times a day as needed for moderate pain for up to 20 doses, Starting Thu 3/18/2021, Print      valACYclovir (VALTREX) 1,000 mg tablet Take 1 tablet (1,000 mg total) by mouth 3 (three) times a day for 7 days, Starting Mon 9/28/2020, Until Mon 10/5/2020, Print       !! - Potential duplicate medications found. Please discuss with provider.        No discharge procedures on file.  ED SEPSIS DOCUMENTATION   Time reflects when diagnosis was documented in both MDM as applicable and the Disposition within this note       Time User Action Codes Description Comment    11/25/2024  9:13 PM Deonte Lares Add [M25.552] Left hip pain                  Deonte Lares MD  11/25/24 5739